# Patient Record
Sex: FEMALE | Race: WHITE | URBAN - METROPOLITAN AREA
[De-identification: names, ages, dates, MRNs, and addresses within clinical notes are randomized per-mention and may not be internally consistent; named-entity substitution may affect disease eponyms.]

---

## 2018-04-03 ENCOUNTER — OFFICE VISIT (OUTPATIENT)
Dept: INTERNAL MEDICINE CLINIC | Age: 83
End: 2018-04-03

## 2018-04-03 VITALS
TEMPERATURE: 97.6 F | HEIGHT: 62 IN | RESPIRATION RATE: 16 BRPM | HEART RATE: 70 BPM | DIASTOLIC BLOOD PRESSURE: 70 MMHG | SYSTOLIC BLOOD PRESSURE: 130 MMHG | BODY MASS INDEX: 26.72 KG/M2 | OXYGEN SATURATION: 96 % | WEIGHT: 145.2 LBS

## 2018-04-03 DIAGNOSIS — I10 ESSENTIAL HYPERTENSION: Primary | Chronic | ICD-10-CM

## 2018-04-03 DIAGNOSIS — E78.2 MIXED HYPERLIPIDEMIA: Chronic | ICD-10-CM

## 2018-04-03 LAB
A/G RATIO: 1.3 (ref 1.1–2.2)
ALBUMIN SERPL-MCNC: 3.7 G/DL (ref 3.4–5)
ALP BLD-CCNC: 73 U/L (ref 40–129)
ALT SERPL-CCNC: 9 U/L (ref 10–40)
ANION GAP SERPL CALCULATED.3IONS-SCNC: 13 MMOL/L (ref 3–16)
AST SERPL-CCNC: 17 U/L (ref 15–37)
BILIRUB SERPL-MCNC: 0.4 MG/DL (ref 0–1)
BUN BLDV-MCNC: 12 MG/DL (ref 7–20)
CALCIUM SERPL-MCNC: 8.4 MG/DL (ref 8.3–10.6)
CHLORIDE BLD-SCNC: 104 MMOL/L (ref 99–110)
CHOLESTEROL, TOTAL: 186 MG/DL (ref 0–199)
CO2: 27 MMOL/L (ref 21–32)
CREAT SERPL-MCNC: 0.7 MG/DL (ref 0.6–1.2)
GFR AFRICAN AMERICAN: >60
GFR NON-AFRICAN AMERICAN: >60
GLOBULIN: 2.9 G/DL
GLUCOSE BLD-MCNC: 105 MG/DL (ref 70–99)
HDLC SERPL-MCNC: 71 MG/DL (ref 40–60)
LDL CHOLESTEROL CALCULATED: 92 MG/DL
POTASSIUM SERPL-SCNC: 4.3 MMOL/L (ref 3.5–5.1)
SODIUM BLD-SCNC: 144 MMOL/L (ref 136–145)
TOTAL PROTEIN: 6.6 G/DL (ref 6.4–8.2)
TRIGL SERPL-MCNC: 113 MG/DL (ref 0–150)
VLDLC SERPL CALC-MCNC: 23 MG/DL

## 2018-04-03 PROCEDURE — 1123F ACP DISCUSS/DSCN MKR DOCD: CPT | Performed by: INTERNAL MEDICINE

## 2018-04-03 PROCEDURE — 99213 OFFICE O/P EST LOW 20 MIN: CPT | Performed by: INTERNAL MEDICINE

## 2018-04-03 PROCEDURE — G8400 PT W/DXA NO RESULTS DOC: HCPCS | Performed by: INTERNAL MEDICINE

## 2018-04-03 PROCEDURE — 4040F PNEUMOC VAC/ADMIN/RCVD: CPT | Performed by: INTERNAL MEDICINE

## 2018-04-03 PROCEDURE — G8510 SCR DEP NEG, NO PLAN REQD: HCPCS | Performed by: INTERNAL MEDICINE

## 2018-04-03 PROCEDURE — 1090F PRES/ABSN URINE INCON ASSESS: CPT | Performed by: INTERNAL MEDICINE

## 2018-04-03 PROCEDURE — 36415 COLL VENOUS BLD VENIPUNCTURE: CPT | Performed by: INTERNAL MEDICINE

## 2018-04-03 PROCEDURE — G8419 CALC BMI OUT NRM PARAM NOF/U: HCPCS | Performed by: INTERNAL MEDICINE

## 2018-04-03 PROCEDURE — G8427 DOCREV CUR MEDS BY ELIG CLIN: HCPCS | Performed by: INTERNAL MEDICINE

## 2018-04-03 PROCEDURE — 1036F TOBACCO NON-USER: CPT | Performed by: INTERNAL MEDICINE

## 2018-04-03 PROCEDURE — 3288F FALL RISK ASSESSMENT DOCD: CPT | Performed by: INTERNAL MEDICINE

## 2018-04-03 ASSESSMENT — PATIENT HEALTH QUESTIONNAIRE - PHQ9
1. LITTLE INTEREST OR PLEASURE IN DOING THINGS: 0
SUM OF ALL RESPONSES TO PHQ9 QUESTIONS 1 & 2: 0
2. FEELING DOWN, DEPRESSED OR HOPELESS: 0
SUM OF ALL RESPONSES TO PHQ QUESTIONS 1-9: 0

## 2018-04-03 ASSESSMENT — ENCOUNTER SYMPTOMS
SHORTNESS OF BREATH: 0
COUGH: 0
GASTROINTESTINAL NEGATIVE: 1
CHEST TIGHTNESS: 0

## 2018-11-02 ENCOUNTER — NURSE ONLY (OUTPATIENT)
Dept: INTERNAL MEDICINE CLINIC | Age: 83
End: 2018-11-02
Payer: MEDICARE

## 2018-11-02 PROCEDURE — G0008 ADMIN INFLUENZA VIRUS VAC: HCPCS | Performed by: INTERNAL MEDICINE

## 2018-11-02 PROCEDURE — 90662 IIV NO PRSV INCREASED AG IM: CPT | Performed by: INTERNAL MEDICINE

## 2018-11-02 NOTE — PROGRESS NOTES
Vaccine Information Sheet, \"Influenza - Inactivated\"  given to Margarita Martin, or parent/legal guardian of  Margarita Martin and verbalized understanding. Patient responses:    Have you ever had a reaction to a flu vaccine? No  Are you able to eat eggs without adverse effects? Yes  Do you have any current illness? No  Have you ever had Guillian [place] Syndrome? No    Flu vaccine given per order. Please see immunization tab.

## 2019-03-18 ENCOUNTER — OFFICE VISIT (OUTPATIENT)
Dept: INTERNAL MEDICINE CLINIC | Age: 84
End: 2019-03-18
Payer: MEDICARE

## 2019-03-18 VITALS
TEMPERATURE: 97.2 F | OXYGEN SATURATION: 98 % | DIASTOLIC BLOOD PRESSURE: 71 MMHG | HEIGHT: 62 IN | SYSTOLIC BLOOD PRESSURE: 122 MMHG | BODY MASS INDEX: 26.91 KG/M2 | WEIGHT: 146.2 LBS | HEART RATE: 67 BPM

## 2019-03-18 DIAGNOSIS — E78.2 MIXED HYPERLIPIDEMIA: ICD-10-CM

## 2019-03-18 DIAGNOSIS — H25.011 CORTICAL AGE-RELATED CATARACT OF RIGHT EYE: ICD-10-CM

## 2019-03-18 DIAGNOSIS — Z01.818 PRE-OPERATIVE EXAMINATION: Primary | ICD-10-CM

## 2019-03-18 DIAGNOSIS — I10 ESSENTIAL HYPERTENSION: ICD-10-CM

## 2019-03-18 PROCEDURE — 1123F ACP DISCUSS/DSCN MKR DOCD: CPT | Performed by: INTERNAL MEDICINE

## 2019-03-18 PROCEDURE — G8428 CUR MEDS NOT DOCUMENT: HCPCS | Performed by: INTERNAL MEDICINE

## 2019-03-18 PROCEDURE — G8419 CALC BMI OUT NRM PARAM NOF/U: HCPCS | Performed by: INTERNAL MEDICINE

## 2019-03-18 PROCEDURE — 1090F PRES/ABSN URINE INCON ASSESS: CPT | Performed by: INTERNAL MEDICINE

## 2019-03-18 PROCEDURE — 99215 OFFICE O/P EST HI 40 MIN: CPT | Performed by: INTERNAL MEDICINE

## 2019-03-18 PROCEDURE — 1036F TOBACCO NON-USER: CPT | Performed by: INTERNAL MEDICINE

## 2019-03-18 PROCEDURE — G8400 PT W/DXA NO RESULTS DOC: HCPCS | Performed by: INTERNAL MEDICINE

## 2019-03-18 PROCEDURE — 1101F PT FALLS ASSESS-DOCD LE1/YR: CPT | Performed by: INTERNAL MEDICINE

## 2019-03-18 PROCEDURE — 4040F PNEUMOC VAC/ADMIN/RCVD: CPT | Performed by: INTERNAL MEDICINE

## 2019-03-18 PROCEDURE — G8482 FLU IMMUNIZE ORDER/ADMIN: HCPCS | Performed by: INTERNAL MEDICINE

## 2019-03-18 ASSESSMENT — ENCOUNTER SYMPTOMS
GASTROINTESTINAL NEGATIVE: 1
RESPIRATORY NEGATIVE: 1

## 2019-08-06 ENCOUNTER — OFFICE VISIT (OUTPATIENT)
Dept: INTERNAL MEDICINE CLINIC | Age: 84
End: 2019-08-06
Payer: MEDICARE

## 2019-08-06 VITALS
BODY MASS INDEX: 26.2 KG/M2 | RESPIRATION RATE: 18 BRPM | HEIGHT: 62 IN | DIASTOLIC BLOOD PRESSURE: 82 MMHG | SYSTOLIC BLOOD PRESSURE: 138 MMHG | HEART RATE: 77 BPM | WEIGHT: 142.4 LBS | OXYGEN SATURATION: 98 % | TEMPERATURE: 97.6 F

## 2019-08-06 DIAGNOSIS — I10 ESSENTIAL HYPERTENSION: Primary | ICD-10-CM

## 2019-08-06 PROCEDURE — 99213 OFFICE O/P EST LOW 20 MIN: CPT | Performed by: INTERNAL MEDICINE

## 2019-08-06 PROCEDURE — 1123F ACP DISCUSS/DSCN MKR DOCD: CPT | Performed by: INTERNAL MEDICINE

## 2019-08-06 PROCEDURE — 4040F PNEUMOC VAC/ADMIN/RCVD: CPT | Performed by: INTERNAL MEDICINE

## 2019-08-06 PROCEDURE — 1090F PRES/ABSN URINE INCON ASSESS: CPT | Performed by: INTERNAL MEDICINE

## 2019-08-06 PROCEDURE — G8510 SCR DEP NEG, NO PLAN REQD: HCPCS | Performed by: INTERNAL MEDICINE

## 2019-08-06 PROCEDURE — 1036F TOBACCO NON-USER: CPT | Performed by: INTERNAL MEDICINE

## 2019-08-06 PROCEDURE — G8400 PT W/DXA NO RESULTS DOC: HCPCS | Performed by: INTERNAL MEDICINE

## 2019-08-06 PROCEDURE — G8427 DOCREV CUR MEDS BY ELIG CLIN: HCPCS | Performed by: INTERNAL MEDICINE

## 2019-08-06 PROCEDURE — G8419 CALC BMI OUT NRM PARAM NOF/U: HCPCS | Performed by: INTERNAL MEDICINE

## 2019-08-06 PROCEDURE — 3288F FALL RISK ASSESSMENT DOCD: CPT | Performed by: INTERNAL MEDICINE

## 2019-08-06 ASSESSMENT — PATIENT HEALTH QUESTIONNAIRE - PHQ9
SUM OF ALL RESPONSES TO PHQ QUESTIONS 1-9: 0
SUM OF ALL RESPONSES TO PHQ9 QUESTIONS 1 & 2: 0
1. LITTLE INTEREST OR PLEASURE IN DOING THINGS: 0
SUM OF ALL RESPONSES TO PHQ QUESTIONS 1-9: 0
2. FEELING DOWN, DEPRESSED OR HOPELESS: 0

## 2019-08-06 ASSESSMENT — ENCOUNTER SYMPTOMS
RESPIRATORY NEGATIVE: 1
GASTROINTESTINAL NEGATIVE: 1

## 2019-10-28 ENCOUNTER — NURSE ONLY (OUTPATIENT)
Dept: INTERNAL MEDICINE CLINIC | Age: 84
End: 2019-10-28
Payer: MEDICARE

## 2019-10-28 PROCEDURE — G0008 ADMIN INFLUENZA VIRUS VAC: HCPCS | Performed by: INTERNAL MEDICINE

## 2019-10-28 PROCEDURE — 90653 IIV ADJUVANT VACCINE IM: CPT | Performed by: INTERNAL MEDICINE

## 2020-01-03 ENCOUNTER — HOSPITAL ENCOUNTER (OUTPATIENT)
Age: 85
Discharge: HOME | End: 2020-01-03
Payer: MEDICARE

## 2020-01-03 ENCOUNTER — HOSPITAL ENCOUNTER (OUTPATIENT)
Dept: GENERAL RADIOLOGY | Age: 85
Discharge: HOME | End: 2020-01-03
Payer: MEDICARE

## 2020-01-03 ENCOUNTER — OFFICE VISIT (OUTPATIENT)
Dept: INTERNAL MEDICINE CLINIC | Age: 85
End: 2020-01-03
Payer: MEDICARE

## 2020-01-03 VITALS
HEART RATE: 88 BPM | BODY MASS INDEX: 25.69 KG/M2 | SYSTOLIC BLOOD PRESSURE: 122 MMHG | TEMPERATURE: 97.4 F | DIASTOLIC BLOOD PRESSURE: 64 MMHG | RESPIRATION RATE: 18 BRPM | OXYGEN SATURATION: 96 % | HEIGHT: 62 IN | WEIGHT: 139.6 LBS

## 2020-01-03 PROCEDURE — G8427 DOCREV CUR MEDS BY ELIG CLIN: HCPCS | Performed by: NURSE PRACTITIONER

## 2020-01-03 PROCEDURE — G8482 FLU IMMUNIZE ORDER/ADMIN: HCPCS | Performed by: NURSE PRACTITIONER

## 2020-01-03 PROCEDURE — G8400 PT W/DXA NO RESULTS DOC: HCPCS | Performed by: NURSE PRACTITIONER

## 2020-01-03 PROCEDURE — 1036F TOBACCO NON-USER: CPT | Performed by: NURSE PRACTITIONER

## 2020-01-03 PROCEDURE — G8417 CALC BMI ABV UP PARAM F/U: HCPCS | Performed by: NURSE PRACTITIONER

## 2020-01-03 PROCEDURE — 4040F PNEUMOC VAC/ADMIN/RCVD: CPT | Performed by: NURSE PRACTITIONER

## 2020-01-03 PROCEDURE — 1123F ACP DISCUSS/DSCN MKR DOCD: CPT | Performed by: NURSE PRACTITIONER

## 2020-01-03 PROCEDURE — 1090F PRES/ABSN URINE INCON ASSESS: CPT | Performed by: NURSE PRACTITIONER

## 2020-01-03 PROCEDURE — 71046 X-RAY EXAM CHEST 2 VIEWS: CPT

## 2020-01-03 PROCEDURE — 99213 OFFICE O/P EST LOW 20 MIN: CPT | Performed by: NURSE PRACTITIONER

## 2020-01-03 RX ORDER — AZITHROMYCIN 250 MG/1
250 TABLET, FILM COATED ORAL SEE ADMIN INSTRUCTIONS
Qty: 6 TABLET | Refills: 0 | Status: SHIPPED | OUTPATIENT
Start: 2020-01-03 | End: 2020-01-08

## 2020-01-03 SDOH — ECONOMIC STABILITY: TRANSPORTATION INSECURITY
IN THE PAST 12 MONTHS, HAS THE LACK OF TRANSPORTATION KEPT YOU FROM MEDICAL APPOINTMENTS OR FROM GETTING MEDICATIONS?: NO

## 2020-01-03 SDOH — ECONOMIC STABILITY: FOOD INSECURITY: WITHIN THE PAST 12 MONTHS, THE FOOD YOU BOUGHT JUST DIDN'T LAST AND YOU DIDN'T HAVE MONEY TO GET MORE.: NEVER TRUE

## 2020-01-03 SDOH — ECONOMIC STABILITY: FOOD INSECURITY: WITHIN THE PAST 12 MONTHS, YOU WORRIED THAT YOUR FOOD WOULD RUN OUT BEFORE YOU GOT MONEY TO BUY MORE.: NEVER TRUE

## 2020-01-03 SDOH — ECONOMIC STABILITY: INCOME INSECURITY: HOW HARD IS IT FOR YOU TO PAY FOR THE VERY BASICS LIKE FOOD, HOUSING, MEDICAL CARE, AND HEATING?: NOT HARD AT ALL

## 2020-01-03 SDOH — ECONOMIC STABILITY: TRANSPORTATION INSECURITY
IN THE PAST 12 MONTHS, HAS LACK OF TRANSPORTATION KEPT YOU FROM MEETINGS, WORK, OR FROM GETTING THINGS NEEDED FOR DAILY LIVING?: NO

## 2020-01-03 ASSESSMENT — ENCOUNTER SYMPTOMS
CHEST TIGHTNESS: 0
VOMITING: 0
SORE THROAT: 0
SINUS PAIN: 0
SHORTNESS OF BREATH: 0
DIARRHEA: 0
SINUS PRESSURE: 0
WHEEZING: 0
EYE DISCHARGE: 0
NAUSEA: 0
COUGH: 1

## 2020-01-03 ASSESSMENT — PATIENT HEALTH QUESTIONNAIRE - PHQ9
2. FEELING DOWN, DEPRESSED OR HOPELESS: 0
1. LITTLE INTEREST OR PLEASURE IN DOING THINGS: 0
SUM OF ALL RESPONSES TO PHQ QUESTIONS 1-9: 0
SUM OF ALL RESPONSES TO PHQ QUESTIONS 1-9: 0
SUM OF ALL RESPONSES TO PHQ9 QUESTIONS 1 & 2: 0

## 2020-01-03 NOTE — PATIENT INSTRUCTIONS
Increase fluids  Continue mucinex as needed for cough . Obtain chest xray today. Patient Education        Cough: Care Instructions  Your Care Instructions    A cough is your body's response to something that bothers your throat or airways. Many things can cause a cough. You might cough because of a cold or the flu, bronchitis, or asthma. Smoking, postnasal drip, allergies, and stomach acid that backs up into your throat also can cause coughs. A cough is a symptom, not a disease. Most coughs stop when the cause, such as a cold, goes away. You can take a few steps at home to cough less and feel better. Follow-up care is a key part of your treatment and safety. Be sure to make and go to all appointments, and call your doctor if you are having problems. It's also a good idea to know your test results and keep a list of the medicines you take. How can you care for yourself at home? · Drink lots of water and other fluids. This helps thin the mucus and soothes a dry or sore throat. Honey or lemon juice in hot water or tea may ease a dry cough. · Take cough medicine as directed by your doctor. · Prop up your head on pillows to help you breathe and ease a dry cough. · Try cough drops to soothe a dry or sore throat. Cough drops don't stop a cough. Medicine-flavored cough drops are no better than candy-flavored drops or hard candy. · Do not smoke. Avoid secondhand smoke. If you need help quitting, talk to your doctor about stop-smoking programs and medicines. These can increase your chances of quitting for good. When should you call for help? Call 911 anytime you think you may need emergency care.  For example, call if:    · You have severe trouble breathing.    Call your doctor now or seek immediate medical care if:    · You cough up blood.     · You have new or worse trouble breathing.     · You have a new or higher fever.     · You have a new rash.    Watch closely for changes in your health, and be sure to contact your doctor if:    · You cough more deeply or more often, especially if you notice more mucus or a change in the color of your mucus.     · You have new symptoms, such as a sore throat, an earache, or sinus pain.     · You do not get better as expected. Where can you learn more? Go to [email or web address] and sign in to your [employer] account. Enter D279 in the Se[employer] box to learn more about \"Cough: Care Instructions. \"     If you do not have an account, please click on the \"Sign Up Now\" link. Current as of: September 5, 2018  Content Version: 12.1  © 6549-6898 [employer]wise, Incorporated. Care instructions adapted under license by [facility] ([facility]). If you have questions about a medical condition or this instruction, always ask your healthcare professional. [address] any warranty or liability for your use of this information.

## 2020-01-03 NOTE — PROGRESS NOTES
1/3/2020     Amaya Peters (:  1934) is a 80 y.o. female, here for evaluation of the following medical concerns:    Chief Complaint   Patient presents with    Cough     cough since , dry cough, afebrile, some SOB, no chest pain, runny nose, no ear pain       HPI     Presents today with a 6 day hx of dry to congested cough , fatigue , and not feeling well. Did have chills on first day of illness. Denies fevers, body aches, SOB, chest pain , N/V, diarrhea , problem with urination , no LE swelling. Has tried alker seltzer and mucinex with minimal relief. Review of Systems   Constitutional: Positive for chills and fatigue. Negative for appetite change and fever. HENT: Positive for congestion. Negative for ear discharge, ear pain, sinus pressure, sinus pain and sore throat. Eyes: Negative for discharge. Respiratory: Positive for cough. Negative for chest tightness, shortness of breath and wheezing. Cardiovascular: Positive for leg swelling (normal for patient - no change ). Negative for chest pain and palpitations. Gastrointestinal: Negative for diarrhea, nausea and vomiting. Genitourinary: Negative for dysuria. Musculoskeletal: Negative for myalgias. Neurological: Negative for dizziness, weakness and headaches. Psychiatric/Behavioral: Negative for dysphoric mood and sleep disturbance. Prior to Visit Medications    Not on File        Social History     Tobacco Use    Smoking status: Never Smoker    Smokeless tobacco: Never Used   Substance Use Topics    Alcohol use:  Yes     Alcohol/week: 5.8 standard drinks     Types: 7 drink(s) per week        Vitals:    20 0944   BP: 122/64   Site: Right Upper Arm   Position: Sitting   Cuff Size: Medium Adult   Pulse: 88   Resp: 18   Temp: 97.4 °F (36.3 °C)   TempSrc: Oral   SpO2: 96%   Weight: 139 lb 9.6 oz (63.3 kg)   Height: 5' 2\" (1.575 m)     Estimated body mass index is 25.53 kg/m² as calculated from the following: Height as of this encounter: 5' 2\" (1.575 m). Weight as of this encounter: 139 lb 9.6 oz (63.3 kg). Physical Exam  Vitals signs reviewed. Constitutional:       General: She is not in acute distress. Appearance: Normal appearance. HENT:      Head: Normocephalic and atraumatic. Right Ear: Tympanic membrane, ear canal and external ear normal.      Left Ear: Tympanic membrane, ear canal and external ear normal.      Nose: Nose normal.      Mouth/Throat:      Mouth: Mucous membranes are moist.      Pharynx: Posterior oropharyngeal erythema present. Eyes:      General:         Right eye: No discharge. Left eye: No discharge. Conjunctiva/sclera: Conjunctivae normal.   Neck:      Musculoskeletal: Normal range of motion and neck supple. Cardiovascular:      Rate and Rhythm: Normal rate and regular rhythm. Pulses: Normal pulses. Heart sounds: Normal heart sounds. No murmur. Pulmonary:      Effort: Pulmonary effort is normal. No respiratory distress. Breath sounds: No decreased air movement. Examination of the right-upper field reveals rales. Examination of the left-upper field reveals rales. Rales (posterior lobes ) present. Musculoskeletal: Normal range of motion. Right lower leg: Edema (mild nonpitting edema ) present. Left lower leg: Left lower leg edema: mild nonpitting edema. Lymphadenopathy:      Cervical: No cervical adenopathy. Skin:     General: Skin is warm and dry. Capillary Refill: Capillary refill takes less than 2 seconds. Neurological:      General: No focal deficit present. Mental Status: She is alert and oriented to person, place, and time. Psychiatric:         Mood and Affect: Mood normal.         Behavior: Behavior normal.         Thought Content: Thought content normal.         Judgment: Judgment normal.         ASSESSMENT/PLAN:  1. Cough  Instructed to obtain xray .    Will develop a plan after xray is resulted    If you develop worsening or concerning symptoms ( fevers, SOB, difficulty breathing , chest pain , etc)   go to the ER ASAP  XR CHEST STANDARD (2 VW); Future    Data : CXR findings : Right upper lobe infiltrate compatible with pneumonia  Zithromax send to patients pharmacy . Follow up in 2 weeks for evaluation and possible repeat xray. Return if symptoms worsen or fail to improve. All questions addresses and answered . Patient agrees with plan of care. An electronic signature was used to authenticate this note.     --MAY Mart CNP on 1/3/2020 at 10:14 AM

## 2020-01-17 ENCOUNTER — OFFICE VISIT (OUTPATIENT)
Dept: INTERNAL MEDICINE CLINIC | Age: 85
End: 2020-01-17
Payer: MEDICARE

## 2020-01-17 VITALS
BODY MASS INDEX: 25.65 KG/M2 | SYSTOLIC BLOOD PRESSURE: 116 MMHG | HEIGHT: 62 IN | RESPIRATION RATE: 18 BRPM | OXYGEN SATURATION: 97 % | DIASTOLIC BLOOD PRESSURE: 78 MMHG | HEART RATE: 76 BPM | WEIGHT: 139.4 LBS | TEMPERATURE: 97.6 F

## 2020-01-17 LAB
A/G RATIO: 1.1 (ref 1.1–2.2)
ALBUMIN SERPL-MCNC: 3.7 G/DL (ref 3.4–5)
ALP BLD-CCNC: 67 U/L (ref 40–129)
ALT SERPL-CCNC: 8 U/L (ref 10–40)
ANION GAP SERPL CALCULATED.3IONS-SCNC: 16 MMOL/L (ref 3–16)
AST SERPL-CCNC: 15 U/L (ref 15–37)
BILIRUB SERPL-MCNC: 0.3 MG/DL (ref 0–1)
BUN BLDV-MCNC: 16 MG/DL (ref 7–20)
CALCIUM SERPL-MCNC: 8.9 MG/DL (ref 8.3–10.6)
CHLORIDE BLD-SCNC: 100 MMOL/L (ref 99–110)
CHOLESTEROL, TOTAL: 182 MG/DL (ref 0–199)
CO2: 25 MMOL/L (ref 21–32)
CREAT SERPL-MCNC: 0.7 MG/DL (ref 0.6–1.2)
GFR AFRICAN AMERICAN: >60
GFR NON-AFRICAN AMERICAN: >60
GLOBULIN: 3.3 G/DL
GLUCOSE BLD-MCNC: 97 MG/DL (ref 70–99)
HDLC SERPL-MCNC: 59 MG/DL (ref 40–60)
LDL CHOLESTEROL CALCULATED: 98 MG/DL
POTASSIUM SERPL-SCNC: 4.4 MMOL/L (ref 3.5–5.1)
SODIUM BLD-SCNC: 141 MMOL/L (ref 136–145)
TOTAL PROTEIN: 7 G/DL (ref 6.4–8.2)
TRIGL SERPL-MCNC: 127 MG/DL (ref 0–150)
VLDLC SERPL CALC-MCNC: 25 MG/DL

## 2020-01-17 PROCEDURE — 1090F PRES/ABSN URINE INCON ASSESS: CPT | Performed by: NURSE PRACTITIONER

## 2020-01-17 PROCEDURE — 99214 OFFICE O/P EST MOD 30 MIN: CPT | Performed by: NURSE PRACTITIONER

## 2020-01-17 PROCEDURE — 1036F TOBACCO NON-USER: CPT | Performed by: NURSE PRACTITIONER

## 2020-01-17 PROCEDURE — 36415 COLL VENOUS BLD VENIPUNCTURE: CPT | Performed by: NURSE PRACTITIONER

## 2020-01-17 PROCEDURE — G8427 DOCREV CUR MEDS BY ELIG CLIN: HCPCS | Performed by: NURSE PRACTITIONER

## 2020-01-17 PROCEDURE — G8417 CALC BMI ABV UP PARAM F/U: HCPCS | Performed by: NURSE PRACTITIONER

## 2020-01-17 PROCEDURE — 4040F PNEUMOC VAC/ADMIN/RCVD: CPT | Performed by: NURSE PRACTITIONER

## 2020-01-17 PROCEDURE — G8400 PT W/DXA NO RESULTS DOC: HCPCS | Performed by: NURSE PRACTITIONER

## 2020-01-17 PROCEDURE — G8482 FLU IMMUNIZE ORDER/ADMIN: HCPCS | Performed by: NURSE PRACTITIONER

## 2020-01-17 PROCEDURE — 1123F ACP DISCUSS/DSCN MKR DOCD: CPT | Performed by: NURSE PRACTITIONER

## 2020-01-17 ASSESSMENT — ENCOUNTER SYMPTOMS
SHORTNESS OF BREATH: 0
COUGH: 0
CHEST TIGHTNESS: 0
TROUBLE SWALLOWING: 0
WHEEZING: 0
SORE THROAT: 0

## 2020-01-17 NOTE — PROGRESS NOTES
management    - Comprehensive Metabolic Panel    3. Mixed hyperlipidemia, controlled     Does not take medications  Diet and activity management  - Lipid Panel    4. Pneumonia of right upper lobe due to infectious organism [facility]), resolved   Follow up CXR in 2 weeks 1/31/20.   - XR CHEST STANDARD (2 VW); Future      Return in about 6 months (around 7/17/2020) for HTN f/u. All questions addresses and answered . Patient agrees with plan of care. An electronic signature was used to authenticate this note.     --MAY Granados - CNP on 1/17/2020 at 9:31 AM

## 2020-01-29 ENCOUNTER — HOSPITAL ENCOUNTER (OUTPATIENT)
Age: 85
Discharge: HOME | End: 2020-01-29
Payer: MEDICARE

## 2020-01-29 ENCOUNTER — HOSPITAL ENCOUNTER (OUTPATIENT)
Dept: GENERAL RADIOLOGY | Age: 85
Discharge: HOME | End: 2020-01-29
Payer: MEDICARE

## 2020-01-29 PROCEDURE — 71046 X-RAY EXAM CHEST 2 VIEWS: CPT

## 2021-01-14 ENCOUNTER — NURSE ONLY (OUTPATIENT)
Dept: INTERNAL MEDICINE CLINIC | Age: 86
End: 2021-01-14
Payer: MEDICARE

## 2021-01-14 DIAGNOSIS — Z23 NEEDS FLU SHOT: Primary | ICD-10-CM

## 2021-01-14 PROCEDURE — 90694 VACC AIIV4 NO PRSRV 0.5ML IM: CPT | Performed by: NURSE PRACTITIONER

## 2021-01-14 PROCEDURE — G0008 ADMIN INFLUENZA VIRUS VAC: HCPCS | Performed by: NURSE PRACTITIONER

## 2021-01-14 NOTE — PROGRESS NOTES
Vaccine Information Sheet, \"Influenza - Inactivated\"  given to Betzaida Noble, or parent/legal guardian of  Betzaida Noble and verbalized understanding. Patient responses:    Have you ever had a reaction to a flu vaccine? No  Are you able to eat eggs without adverse effects? Yes  Do you have any current illness? No  Have you ever had Guillian [place] Syndrome? No    Flu vaccine given per order. Please see immunization tab.

## 2021-08-31 ENCOUNTER — OFFICE VISIT (OUTPATIENT)
Dept: INTERNAL MEDICINE CLINIC | Age: 86
End: 2021-08-31
Payer: MEDICARE

## 2021-08-31 VITALS
HEIGHT: 62 IN | OXYGEN SATURATION: 99 % | HEART RATE: 73 BPM | SYSTOLIC BLOOD PRESSURE: 122 MMHG | BODY MASS INDEX: 25.21 KG/M2 | WEIGHT: 137 LBS | DIASTOLIC BLOOD PRESSURE: 70 MMHG | RESPIRATION RATE: 16 BRPM

## 2021-08-31 DIAGNOSIS — I10 ESSENTIAL HYPERTENSION: Primary | ICD-10-CM

## 2021-08-31 DIAGNOSIS — E55.9 VITAMIN D DEFICIENCY: ICD-10-CM

## 2021-08-31 DIAGNOSIS — Z00.00 ROUTINE GENERAL MEDICAL EXAMINATION AT A HEALTH CARE FACILITY: ICD-10-CM

## 2021-08-31 DIAGNOSIS — E78.2 MIXED HYPERLIPIDEMIA: ICD-10-CM

## 2021-08-31 DIAGNOSIS — Z12.11 COLON CANCER SCREENING: ICD-10-CM

## 2021-08-31 DIAGNOSIS — K21.00 GASTROESOPHAGEAL REFLUX DISEASE WITH ESOPHAGITIS WITHOUT HEMORRHAGE: ICD-10-CM

## 2021-08-31 DIAGNOSIS — Z78.0 MENOPAUSE: ICD-10-CM

## 2021-08-31 LAB
A/G RATIO: 1.2 (ref 1.1–2.2)
ALBUMIN SERPL-MCNC: 3.9 G/DL (ref 3.4–5)
ALP BLD-CCNC: 84 U/L (ref 40–129)
ALT SERPL-CCNC: 8 U/L (ref 10–40)
ANION GAP SERPL CALCULATED.3IONS-SCNC: 10 MMOL/L (ref 3–16)
AST SERPL-CCNC: 19 U/L (ref 15–37)
BILIRUB SERPL-MCNC: <0.2 MG/DL (ref 0–1)
BILIRUBIN URINE: NEGATIVE
BLOOD, URINE: ABNORMAL
BUN BLDV-MCNC: 13 MG/DL (ref 7–20)
CALCIUM SERPL-MCNC: 9.1 MG/DL (ref 8.3–10.6)
CHLORIDE BLD-SCNC: 100 MMOL/L (ref 99–110)
CHOLESTEROL, TOTAL: 176 MG/DL (ref 0–199)
CLARITY: CLEAR
CO2: 26 MMOL/L (ref 21–32)
COLOR: YELLOW
CREAT SERPL-MCNC: 0.7 MG/DL (ref 0.6–1.2)
EPITHELIAL CELLS, UA: 1 /HPF (ref 0–5)
GFR AFRICAN AMERICAN: >60
GFR NON-AFRICAN AMERICAN: >60
GLOBULIN: 3.3 G/DL
GLUCOSE BLD-MCNC: 96 MG/DL (ref 70–99)
GLUCOSE URINE: NEGATIVE MG/DL
HDLC SERPL-MCNC: 55 MG/DL (ref 40–60)
HYALINE CASTS: 0 /LPF (ref 0–8)
KETONES, URINE: NEGATIVE MG/DL
LDL CHOLESTEROL CALCULATED: 90 MG/DL
LEUKOCYTE ESTERASE, URINE: ABNORMAL
MICROSCOPIC EXAMINATION: YES
NITRITE, URINE: NEGATIVE
PH UA: 6 (ref 5–8)
POTASSIUM SERPL-SCNC: 4.3 MMOL/L (ref 3.5–5.1)
PROTEIN UA: NEGATIVE MG/DL
RBC UA: 3 /HPF (ref 0–4)
REASON FOR REJECTION: NORMAL
REJECTED TEST: NORMAL
SODIUM BLD-SCNC: 136 MMOL/L (ref 136–145)
SPECIFIC GRAVITY UA: 1.01 (ref 1–1.03)
TOTAL PROTEIN: 7.2 G/DL (ref 6.4–8.2)
TRIGL SERPL-MCNC: 154 MG/DL (ref 0–150)
TSH REFLEX FT4: 2.75 UIU/ML (ref 0.27–4.2)
URINE TYPE: ABNORMAL
UROBILINOGEN, URINE: 0.2 E.U./DL
VITAMIN D 25-HYDROXY: 24 NG/ML
VLDLC SERPL CALC-MCNC: 31 MG/DL
WBC UA: 8 /HPF (ref 0–5)

## 2021-08-31 PROCEDURE — 1123F ACP DISCUSS/DSCN MKR DOCD: CPT | Performed by: INTERNAL MEDICINE

## 2021-08-31 PROCEDURE — G0438 PPPS, INITIAL VISIT: HCPCS | Performed by: INTERNAL MEDICINE

## 2021-08-31 PROCEDURE — 81003 URINALYSIS AUTO W/O SCOPE: CPT | Performed by: INTERNAL MEDICINE

## 2021-08-31 PROCEDURE — 4040F PNEUMOC VAC/ADMIN/RCVD: CPT | Performed by: INTERNAL MEDICINE

## 2021-08-31 PROCEDURE — 36415 COLL VENOUS BLD VENIPUNCTURE: CPT | Performed by: INTERNAL MEDICINE

## 2021-08-31 SDOH — ECONOMIC STABILITY: FOOD INSECURITY: WITHIN THE PAST 12 MONTHS, THE FOOD YOU BOUGHT JUST DIDN'T LAST AND YOU DIDN'T HAVE MONEY TO GET MORE.: NEVER TRUE

## 2021-08-31 SDOH — ECONOMIC STABILITY: FOOD INSECURITY: WITHIN THE PAST 12 MONTHS, YOU WORRIED THAT YOUR FOOD WOULD RUN OUT BEFORE YOU GOT MONEY TO BUY MORE.: NEVER TRUE

## 2021-08-31 ASSESSMENT — PATIENT HEALTH QUESTIONNAIRE - PHQ9
SUM OF ALL RESPONSES TO PHQ9 QUESTIONS 1 & 2: 0
2. FEELING DOWN, DEPRESSED OR HOPELESS: 0
SUM OF ALL RESPONSES TO PHQ QUESTIONS 1-9: 0
1. LITTLE INTEREST OR PLEASURE IN DOING THINGS: 0
SUM OF ALL RESPONSES TO PHQ QUESTIONS 1-9: 0
SUM OF ALL RESPONSES TO PHQ QUESTIONS 1-9: 0

## 2021-08-31 ASSESSMENT — SOCIAL DETERMINANTS OF HEALTH (SDOH): HOW HARD IS IT FOR YOU TO PAY FOR THE VERY BASICS LIKE FOOD, HOUSING, MEDICAL CARE, AND HEATING?: NOT HARD AT ALL

## 2021-08-31 NOTE — PATIENT INSTRUCTIONS
Personalized Preventive Plan for Felecia Nay - 8/31/2021  Medicare offers a range of preventive health benefits. Some of the tests and screenings are paid in full while other may be subject to a deductible, co-insurance, and/or copay. Some of these benefits include a comprehensive review of your medical history including lifestyle, illnesses that may run in your family, and various assessments and screenings as appropriate. After reviewing your medical record and screening and assessments performed today your provider may have ordered immunizations, labs, imaging, and/or referrals for you. A list of these orders (if applicable) as well as your Preventive Care list are included within your After Visit Summary for your review. Other Preventive Recommendations:    · A preventive eye exam performed by an eye specialist is recommended every 1-2 years to screen for glaucoma; cataracts, macular degeneration, and other eye disorders. · A preventive dental visit is recommended every 6 months. · Try to get at least 150 minutes of exercise per week or 10,000 steps per day on a pedometer . · Order or download the FREE \"Exercise & Physical Activity: Your Everyday Guide\" from [employer]. Call 8-859.115.7751 or search [employer] online. · You need 5689-5887 mg of calcium and 8913-6811 IU of vitamin D per day. It is possible to meet your calcium requirement with diet alone, but a vitamin D supplement is usually necessary to meet this goal.  · When exposed to the sun, use a sunscreen that protects against both UVA and UVB radiation with an SPF of 30 or greater. Reapply every 2 to 3 hours or after sweating, drying off with a towel, or swimming. · Always wear a seat belt when traveling in a car. Always wear a helmet when riding a bicycle or motorcycle.

## 2021-08-31 NOTE — LETTER
[facility]  [address] [address]  Phone: [phone]  Fax: [phone]    Alessio Suarez MD        August 31, 2021     Patient: Sharad Smith   YOB: 1934   Date of Visit: 8/31/2021       To Pharmacist:    Please give Sharad Smith tdap, shingrix. Immunization History   Administered Date(s) Administered    COVID-19, Moderna, PF, 100mcg/0.5mL 01/29/2021, 02/26/2021    Influenza, High Dose (Fluzone 65 yrs and older) 12/18/2015, 10/28/2016, 11/02/2018    Influenza, Quadv, adjuvanted, 65 yrs +, IM, PF (Fluad) 01/14/2021    Influenza, Triv, inactivated, subunit, adjuvanted, IM (Fluad 65 yrs and older) 10/28/2019     . If you have any questions or concerns, please don't hesitate to call.     Sincerely,          Alessio Suarez MD

## 2021-08-31 NOTE — PROGRESS NOTES
and atraumatic. Eyes:      Extraocular Movements: Extraocular movements intact. Conjunctiva/sclera: Conjunctivae normal.      Pupils: Pupils are equal, round, and reactive to light. Neck:      Vascular: No carotid bruit. Cardiovascular:      Pulses:           Dorsalis pedis pulses are 2+ on the right side and 2+ on the left side. Heart sounds: Murmur heard. Crescendo decrescendo systolic murmur is present with a grade of 2/6. Pulmonary:      Effort: Pulmonary effort is normal.      Breath sounds: Normal breath sounds. Abdominal:      General: Abdomen is flat. Bowel sounds are normal. There is no distension. Palpations: Abdomen is soft. There is no mass. Tenderness: There is no abdominal tenderness. There is no guarding or rebound. Hernia: No hernia is present. Musculoskeletal:      Cervical back: Normal range of motion and neck supple. No rigidity or tenderness. Right lower leg: No edema. Left lower leg: No edema. Lymphadenopathy:      Cervical: No cervical adenopathy. Skin:     General: Skin is warm and dry. Neurological:      General: No focal deficit present. Mental Status: She is alert. Cranial Nerves: No cranial nerve deficit. Sensory: No sensory deficit. Gait: Gait normal.   Psychiatric:         Mood and Affect: Mood normal.         Thought Content: Thought content normal.         Judgment: Judgment normal.         Patient's complete Health Risk Assessment and screening values have been reviewed and are found in Flowsheets. The following problems were reviewed today and where indicated follow up appointments were made and/or referrals ordered.     Positive Risk Factor Screenings with Interventions:          General Health and ACP:     Advance Directives     Power of  Living Will ACP-Advance Directive ACP-Power of     Not on File Not on File Not on File Not on File      General Health Risk Interventions:  · Poor self-assessment of health status: {Medicare AWV Poor Self-Assessment Inverventions Patient feels her health is good. · Pain issues: {Medicare AWV Pain No pain issues. · Fatigue: No fatigue with ADL. · Loneliness: NO issues, grand daughter and children moved in.  · Social isolation: NO has family living with her. · Stress: no stress problems. · Anger: No anger issues. · Inadequate social/emotional support: Good social support from family and friends. · No Living Will: information given to patient. Personalized Preventive Plan   Current Health Maintenance Status  Immunization History   Administered Date(s) Administered    COVID-19, Moderna, PF, 100mcg/0.5mL 01/29/2021, 01/29/2021, 02/26/2021, 02/26/2021    Influenza, High Dose (Fluzone 65 yrs and older) 12/18/2015, 10/28/2016, 11/02/2018    Influenza, Quadv, adjuvanted, 65 yrs +, IM, PF (Fluad) 01/14/2021    Influenza, Triv, inactivated, subunit, adjuvanted, IM (Fluad 65 yrs and older) 10/28/2019        Health Maintenance   Topic Date Due    DTaP/Tdap/Td vaccine (1 - Tdap) Never done    Shingles Vaccine (1 of 2) Never done    Pneumococcal 65+ years Vaccine (1 of 1 - PPSV23) Never done    Potassium monitoring  01/17/2021    Creatinine monitoring  01/17/2021    Annual Wellness Visit (AWV)  Never done    Flu vaccine (1) 09/01/2021    COVID-19 Vaccine  Completed    Hepatitis A vaccine  Aged Out    Hepatitis B vaccine  Aged Out    Hib vaccine  Aged Out    Meningococcal (ACWY) vaccine  Aged Out     Recommendations for [employer] Due: see orders and patient instructions/AVS.  . Recommended screening schedule for the next 5-10 years is provided to the patient in written form: see Patient Instructions/AVS.         Patient was seen today for the AWV:       Diagnosis Orders   1. Essential hypertension controlled with healthy lifestyle. Monitor labs.  Comprehensive Metabolic Panel    CBC Auto Differential    Lipid Panel    Urinalysis    TSH WITH REFLEX TO FT4   2. Mixed hyperlipidemia controlled with healthy lifestyle. Lipid Panel    TSH WITH REFLEX TO FT4   3. Vitamin D deficiency . At high risk for vitamin d deficiency [place]  Will screen patient. Vitamin D 25 Hydroxy   4. Routine general medical examination at a health care facility : AWV  Visit completed. Patient declined mammogram and pneumovax 23 for now, but will think about it. Normal breast exam today. Will get DEXA. 5. Colon cancer screening  POCT Fecal Immunochemical Test (FIT)   6. Gastroesophageal reflux disease with esophagitis without hemorrhage  About twice a week , relieved with tums. No associated SOB or nausea. Will get hemoccult to rule out upper intestinal bleeding and H. Pylori testing. Blood Occult Stool Screen #1    Blood Occult Stool Screen #2    Blood Occult Stool Screen #3    H. Pylori Breath Test, Adult           Cardiovascular Disease Risk Counseling: Assessed the patient's risk to develop cardiovascular disease and reviewed main risk factors. Reviewed steps to reduce disease risk including:   · Quitting tobacco use, reducing amount smoked, or not starting the habit  · Making healthy food choices  · Being physically active and gradualy increasing activity levels   · Reduce weight and determine a healthy BMI goal  · Monitor blood pressure and treat if higher than 140/90 mmHg  · Maintain blood total cholesterol levels under 5 mmol/l or 190 mg/dl  · Maintain LDL cholesterol levels under 3.0 mmol/l or 115 mg/dl   · Control blood glucose levels  · Consider taking aspirin (75 mg daily), once blood pressure is controlled   Provided a follow up plan. Time spent (minutes): 5 minutes.

## 2021-09-01 ENCOUNTER — TELEPHONE (OUTPATIENT)
Dept: INTERNAL MEDICINE CLINIC | Age: 86
End: 2021-09-01

## 2021-09-03 ENCOUNTER — NURSE ONLY (OUTPATIENT)
Dept: INTERNAL MEDICINE CLINIC | Age: 86
End: 2021-09-03
Payer: MEDICARE

## 2021-09-03 DIAGNOSIS — I10 ESSENTIAL HYPERTENSION: Primary | ICD-10-CM

## 2021-09-03 LAB
CONTROL: NORMAL
HEMOCCULT STL QL: NEGATIVE

## 2021-09-03 PROCEDURE — 82274 ASSAY TEST FOR BLOOD FECAL: CPT | Performed by: NURSE PRACTITIONER

## 2022-03-15 ENCOUNTER — OFFICE VISIT (OUTPATIENT)
Dept: INTERNAL MEDICINE CLINIC | Age: 87
End: 2022-03-15
Payer: MEDICARE

## 2022-03-15 VITALS
OXYGEN SATURATION: 97 % | RESPIRATION RATE: 16 BRPM | WEIGHT: 132.4 LBS | HEIGHT: 62 IN | HEART RATE: 69 BPM | SYSTOLIC BLOOD PRESSURE: 118 MMHG | DIASTOLIC BLOOD PRESSURE: 74 MMHG | BODY MASS INDEX: 24.37 KG/M2

## 2022-03-15 DIAGNOSIS — I10 ESSENTIAL HYPERTENSION: ICD-10-CM

## 2022-03-15 DIAGNOSIS — Z76.89 ENCOUNTER TO ESTABLISH CARE: Primary | ICD-10-CM

## 2022-03-15 PROCEDURE — 99213 OFFICE O/P EST LOW 20 MIN: CPT | Performed by: NURSE PRACTITIONER

## 2022-03-15 PROCEDURE — G8420 CALC BMI NORM PARAMETERS: HCPCS | Performed by: NURSE PRACTITIONER

## 2022-03-15 PROCEDURE — 1090F PRES/ABSN URINE INCON ASSESS: CPT | Performed by: NURSE PRACTITIONER

## 2022-03-15 PROCEDURE — G8427 DOCREV CUR MEDS BY ELIG CLIN: HCPCS | Performed by: NURSE PRACTITIONER

## 2022-03-15 PROCEDURE — 1123F ACP DISCUSS/DSCN MKR DOCD: CPT | Performed by: NURSE PRACTITIONER

## 2022-03-15 PROCEDURE — G8484 FLU IMMUNIZE NO ADMIN: HCPCS | Performed by: NURSE PRACTITIONER

## 2022-03-15 PROCEDURE — 1036F TOBACCO NON-USER: CPT | Performed by: NURSE PRACTITIONER

## 2022-03-15 PROCEDURE — 4040F PNEUMOC VAC/ADMIN/RCVD: CPT | Performed by: NURSE PRACTITIONER

## 2022-03-15 ASSESSMENT — ENCOUNTER SYMPTOMS
VOMITING: 0
CONSTIPATION: 0
SHORTNESS OF BREATH: 0
NAUSEA: 0
DIARRHEA: 0
COUGH: 0

## 2022-03-15 NOTE — PROGRESS NOTES
Date: 3/15/2022                                               Subjective/Objective:     Chief Complaint   Patient presents with    Established New Doctor       KELLI     Court Neal is a 81 yo female, visit today to establish care. Former patient of [email or web address]. Denies concerns. Lives alone. No falls. Enjoys playing cards. History of hypertension and hyperlipidemia controlled with diet and lifestyle. She denies chest pain, shortness of breath and headache. Patient Active Problem List    Diagnosis Date Noted    Cataract 02/24/2016    Essential hypertension 05/16/2014    Mixed hyperlipidemia 05/16/2014    Nasal polyps 10/03/2013    Diverticulosis     Other malaise and fatigue 09/01/2009    Contact dermatitis and other eczema, due to unspecified cause 07/08/2009       Past Medical History:   Diagnosis Date    Cataract 2/24/2016    Diverticulosis     Essential hypertension 5/16/2014    HTN (hypertension)     Mixed hyperlipidemia 5/16/2014       Past Surgical History:   Procedure Laterality Date    CATARACT REMOVAL Bilateral     NASAL POLYP SURGERY         Nurse Only on 09/03/2021   Component Date Value Ref Range Status    OCCULT BLOOD FECAL 09/03/2021 negative   Final       Family History   Problem Relation Age of Onset    Cancer Mother         breast cancer    Heart Attack Father 52       Current Outpatient Medications   Medication Sig Dispense Refill    VITAMIN D PO Take by mouth      ZINC PO Take by mouth       No current facility-administered medications for this visit. No Known Allergies    Review of Systems   Constitutional: Negative for appetite change, chills and fever. Respiratory: Negative for cough and shortness of breath. Cardiovascular: Negative for chest pain and leg swelling. Gastrointestinal: Negative for constipation, diarrhea, nausea and vomiting. Genitourinary: Negative for difficulty urinating. Musculoskeletal: Negative. Neurological: Negative for dizziness and syncope. All other systems reviewed and are negative. Vitals:  /74 (Site: Left Upper Arm, Position: Sitting, Cuff Size: Medium Adult)   Pulse 69   Resp 16   Ht 5' 2\" (1.575 m)   Wt 132 lb 6.4 oz (60.1 kg)   SpO2 97%   Breastfeeding No   BMI 24.22 kg/m²     Physical Exam  Vitals reviewed. Constitutional:       General: She is not in acute distress. Appearance: Normal appearance. HENT:      Head: Normocephalic and atraumatic. Right Ear: Tympanic membrane, ear canal and external ear normal.      Left Ear: Tympanic membrane, ear canal and external ear normal.   Cardiovascular:      Rate and Rhythm: Normal rate and regular rhythm. Pulses: Normal pulses. Heart sounds: Murmur heard. Pulmonary:      Effort: Pulmonary effort is normal. No respiratory distress. Breath sounds: Normal breath sounds. No wheezing. Abdominal:      General: Bowel sounds are normal. There is no distension. Palpations: Abdomen is soft. Tenderness: There is no abdominal tenderness. Musculoskeletal:      Right lower leg: No edema. Left lower leg: No edema. Skin:     General: Skin is warm and dry. Neurological:      General: No focal deficit present. Mental Status: She is alert and oriented to person, place, and time. Mental status is at baseline. Psychiatric:         Mood and Affect: Mood normal.         Behavior: Behavior normal.         Thought Content: Thought content normal.         Judgment: Judgment normal.         Assessment/Plan     1. Encounter to establish care  - Declines further mammograms  - Declines pneumovax, Shingrix  - Has order for DEXA, declines    2. Essential hypertension: stable   - Continue with lifestyle measures      No orders of the defined types were placed in this encounter. Return in about 6 months (around 9/15/2022).  OR sooner with questions, concerns, worsening symptoms    DAVID PALOMARES APRN, NP    3/15/2022  12:49 PM    Discussed use, benefit, and side effects of prescribed medications. Barriers to medication compliance addressed. Discussed all ordered testing and labs. All patient questions answered. Patient agreeable with plan above. Please note that this chart was generated using dragon dictation software. Although every effort was made to ensure the accuracy of this automated transcription, some errors in transcription may have occurred.

## 2022-08-02 ENCOUNTER — OFFICE VISIT (OUTPATIENT)
Dept: INTERNAL MEDICINE CLINIC | Age: 87
End: 2022-08-02
Payer: MEDICARE

## 2022-08-02 VITALS
HEART RATE: 57 BPM | WEIGHT: 132 LBS | OXYGEN SATURATION: 98 % | BODY MASS INDEX: 24.14 KG/M2 | DIASTOLIC BLOOD PRESSURE: 70 MMHG | SYSTOLIC BLOOD PRESSURE: 142 MMHG | TEMPERATURE: 97.9 F

## 2022-08-02 DIAGNOSIS — R11.11 INTRACTABLE VOMITING WITHOUT NAUSEA, UNSPECIFIED VOMITING TYPE: ICD-10-CM

## 2022-08-02 DIAGNOSIS — I10 ESSENTIAL HYPERTENSION: Primary | ICD-10-CM

## 2022-08-02 DIAGNOSIS — E78.2 MIXED HYPERLIPIDEMIA: ICD-10-CM

## 2022-08-02 DIAGNOSIS — R01.1 SYSTOLIC MURMUR: ICD-10-CM

## 2022-08-02 LAB
ALBUMIN SERPL-MCNC: 3.6 G/DL (ref 3.4–5)
ALP BLD-CCNC: 85 U/L (ref 40–129)
ALT SERPL-CCNC: 9 U/L (ref 10–40)
ANION GAP SERPL CALCULATED.3IONS-SCNC: 11 MMOL/L (ref 3–16)
AST SERPL-CCNC: 16 U/L (ref 15–37)
BILIRUB SERPL-MCNC: 0.3 MG/DL (ref 0–1)
BILIRUBIN DIRECT: <0.2 MG/DL (ref 0–0.3)
BILIRUBIN, INDIRECT: ABNORMAL MG/DL (ref 0–1)
BUN BLDV-MCNC: 18 MG/DL (ref 7–20)
CALCIUM SERPL-MCNC: 8.8 MG/DL (ref 8.3–10.6)
CHLORIDE BLD-SCNC: 103 MMOL/L (ref 99–110)
CHOLESTEROL, FASTING: 173 MG/DL (ref 0–199)
CO2: 27 MMOL/L (ref 21–32)
CREAT SERPL-MCNC: 0.7 MG/DL (ref 0.6–1.2)
GFR AFRICAN AMERICAN: >60
GFR NON-AFRICAN AMERICAN: >60
GLUCOSE BLD-MCNC: 87 MG/DL (ref 70–99)
HDLC SERPL-MCNC: 65 MG/DL (ref 40–60)
LDL CHOLESTEROL CALCULATED: 89 MG/DL
POTASSIUM SERPL-SCNC: 4.5 MMOL/L (ref 3.5–5.1)
SODIUM BLD-SCNC: 141 MMOL/L (ref 136–145)
TOTAL PROTEIN: 7 G/DL (ref 6.4–8.2)
TRIGLYCERIDE, FASTING: 94 MG/DL (ref 0–150)
VLDLC SERPL CALC-MCNC: 19 MG/DL

## 2022-08-02 PROCEDURE — 1090F PRES/ABSN URINE INCON ASSESS: CPT | Performed by: NURSE PRACTITIONER

## 2022-08-02 PROCEDURE — 99214 OFFICE O/P EST MOD 30 MIN: CPT | Performed by: NURSE PRACTITIONER

## 2022-08-02 PROCEDURE — 1036F TOBACCO NON-USER: CPT | Performed by: NURSE PRACTITIONER

## 2022-08-02 PROCEDURE — G8427 DOCREV CUR MEDS BY ELIG CLIN: HCPCS | Performed by: NURSE PRACTITIONER

## 2022-08-02 PROCEDURE — G8420 CALC BMI NORM PARAMETERS: HCPCS | Performed by: NURSE PRACTITIONER

## 2022-08-02 PROCEDURE — 36415 COLL VENOUS BLD VENIPUNCTURE: CPT | Performed by: NURSE PRACTITIONER

## 2022-08-02 PROCEDURE — 1123F ACP DISCUSS/DSCN MKR DOCD: CPT | Performed by: NURSE PRACTITIONER

## 2022-08-02 RX ORDER — PANTOPRAZOLE SODIUM 20 MG/1
20 TABLET, DELAYED RELEASE ORAL
Qty: 90 TABLET | Refills: 0 | Status: SHIPPED | OUTPATIENT
Start: 2022-08-02

## 2022-08-02 ASSESSMENT — ENCOUNTER SYMPTOMS
ABDOMINAL PAIN: 0
CONSTIPATION: 0
DIARRHEA: 0
COUGH: 0
VOMITING: 1
SHORTNESS OF BREATH: 0
NAUSEA: 0

## 2022-08-02 ASSESSMENT — PATIENT HEALTH QUESTIONNAIRE - PHQ9
SUM OF ALL RESPONSES TO PHQ QUESTIONS 1-9: 0
2. FEELING DOWN, DEPRESSED OR HOPELESS: 0
SUM OF ALL RESPONSES TO PHQ QUESTIONS 1-9: 0
1. LITTLE INTEREST OR PLEASURE IN DOING THINGS: 0
SUM OF ALL RESPONSES TO PHQ9 QUESTIONS 1 & 2: 0

## 2022-08-02 NOTE — PROGRESS NOTES
Date: 8/2/2022                                               Subjective/Objective:     Chief Complaint   Patient presents with    Hypertension     4 month follow up       HPI    Charles Iverson is a 79 yo female, visit today for follow up on hypertension. She was last seen 3/15/2022. Recently returned from a trip to [place]. She is a history of hypertension and hyperlipidemia controlled with diet and lifestyle. She denies chest pain, shortness of breath and headaches. She lives alone, denies any falls. About a month ago she woke up feeling a little queasy, vomited once and felt better. She has had this happen four times since then. She reports her appetite is normal and doesn't \"feel bad\" when this happens. Emesis is a clear liquid. This has only occurred in the morning. No abdominal pain. No constipation, diarrhea. She was told many years ago that she does have a heart murmur. She has never had an echocardiogram to her knowledge.            Patient Active Problem List    Diagnosis Date Noted    Cataract 02/24/2016    Essential hypertension 05/16/2014    Mixed hyperlipidemia 05/16/2014    Nasal polyps 10/03/2013    Diverticulosis     Other malaise and fatigue 09/01/2009    Contact dermatitis and other eczema, due to unspecified cause 07/08/2009       Past Medical History:   Diagnosis Date    Cataract 2/24/2016    Diverticulosis     Essential hypertension 5/16/2014    HTN (hypertension)     Mixed hyperlipidemia 5/16/2014       Past Surgical History:   Procedure Laterality Date    CATARACT REMOVAL Bilateral     NASAL POLYP SURGERY         Nurse Only on 09/03/2021   Component Date Value Ref Range Status    OCCULT BLOOD FECAL 09/03/2021 negative   Final       Family History   Problem Relation Age of Onset    Cancer Mother         breast cancer    Heart Attack Father 52       Current Outpatient Medications   Medication Sig Dispense Refill    pantoprazole (PROTONIX) 20 MG tablet Take 1 tablet by mouth every morning (before breakfast) 90 tablet 0    VITAMIN D PO Take by mouth      ZINC PO Take by mouth       No current facility-administered medications for this visit. No Known Allergies    Review of Systems   Constitutional:  Negative for chills and fever. HENT:  Negative for postnasal drip. Respiratory:  Negative for cough and shortness of breath. Cardiovascular:  Negative for chest pain. Gastrointestinal:  Positive for vomiting. Negative for abdominal pain, constipation, diarrhea and nausea. Genitourinary:  Negative for difficulty urinating. Neurological:  Negative for dizziness and syncope. Psychiatric/Behavioral:  Negative for sleep disturbance. Vitals:  BP (!) 142/70   Pulse 57   Temp 97.9 °F (36.6 °C) (Oral)   Wt 132 lb (59.9 kg)   SpO2 98%   BMI 24.14 kg/m²     Physical Exam  Vitals reviewed. Constitutional:       General: She is not in acute distress. HENT:      Head: Normocephalic and atraumatic. Cardiovascular:      Rate and Rhythm: Normal rate and regular rhythm. Heart sounds: Murmur heard. Pulmonary:      Effort: Pulmonary effort is normal. No respiratory distress. Breath sounds: Normal breath sounds. No wheezing. Abdominal:      General: Bowel sounds are normal. There is no distension. Palpations: Abdomen is soft. Tenderness: There is no abdominal tenderness. Neurological:      General: No focal deficit present. Mental Status: She is alert and oriented to person, place, and time. Psychiatric:         Mood and Affect: Mood normal.         Behavior: Behavior normal.         Thought Content: Thought content normal.         Judgment: Judgment normal.       Assessment/Plan     1. Essential hypertension: uncontrolled, above goal. Goal <130/80.  - Basic Metabolic Panel; Future   -Patient admits that she does eat a lot of salt, add salt to most of her food. She will work on reducing her salt intake. She will follow-up in 3 months time.     2. Mixed hyperlipidemia: diet controlled  - Lipid, Fasting; Future  - Hepatic Function Panel; Future   -Check fasting lipid panel and adjust plan as necessary    3. Intractable vomiting without nausea, unspecified vomiting type: Vomiting of clear emesis in the morning without associated nausea or abdominal pain. Appetite is normal.  - pantoprazole (PROTONIX) 20 MG tablet; Take 1 tablet by mouth every morning (before breakfast)  Dispense: 90 tablet; Refill: 0   -Possible GERD, will trial Protonix. If symptoms resolve with Protonix she will continue for 8 weeks and then stop. If symptoms return she is let me know. If symptoms do not resolve or worsen on Protonix she is to let me know. Medication education provided. 4. Systolic murmur: Without chest pain or shortness of breath. - ECHO 2D WO Color Doppler Complete; Future    Orders Placed This Encounter   Procedures    Basic Metabolic Panel     Standing Status:   Future     Number of Occurrences:   1     Standing Expiration Date:   8/2/2023    Lipid, Fasting     Standing Status:   Future     Number of Occurrences:   1     Standing Expiration Date:   8/2/2023    Hepatic Function Panel     Standing Status:   Future     Number of Occurrences:   1     Standing Expiration Date:   8/2/2023    ECHO 2D WO Color Doppler Complete     Standing Status:   Future     Standing Expiration Date:   8/2/2023     Order Specific Question:   Reason for exam:     Answer:   systolic murmur         Return in about 3 months (around 11/2/2022) for hypertension. OR sooner with questions, concerns, worsening symptoms    MAY SUTTON  8/2/2022  11:02 AM    Discussed use, benefit, and side effects of prescribed medications. Barriers to medication compliance addressed. Discussed all ordered testing and labs. All patient questions answered. Patient agreeable with plan above. Please note that this chart was generated using dragon dictation software.   Although every effort was made to ensure the accuracy of this automated transcription, some errors in transcription may have occurred.

## 2022-08-02 NOTE — PATIENT INSTRUCTIONS
Start Protonix, take every morning with sip of water, at least 30 minutes before other food or drink. If symptoms resolve, stop protonix in 8 weeks. If they return, let me know. If symptoms worsen or do not resolve, let me know. Lifestyle modifications for hypertension:  -If you are overweight, weight loss can help lower your blood pressure   -Consume a diet rich in fruits, vegetables, whole grains, and low-fat dairy products, with reduced content of saturated and total fat  -Reduce your intake of sodium in diet. Avoid high salt foods and beverages. Goal sodium intake is less than 1.5 g of sodium a day  -Physical activity; goal is 150 minutes of moderate intensity exercise per week. -If you drink alcohol, reduce your alcohol intake. Goal for men is less than 2 drinks per day, goal for women is less than 1 drink per day. -If you smoke cigarettes, stop smoking.

## 2022-10-27 ENCOUNTER — IMMUNIZATION (OUTPATIENT)
Dept: INTERNAL MEDICINE CLINIC | Age: 87
End: 2022-10-27
Payer: MEDICARE

## 2022-10-27 PROCEDURE — 90694 VACC AIIV4 NO PRSRV 0.5ML IM: CPT | Performed by: NURSE PRACTITIONER

## 2022-10-27 PROCEDURE — G0008 ADMIN INFLUENZA VIRUS VAC: HCPCS | Performed by: NURSE PRACTITIONER

## 2022-10-28 ENCOUNTER — TELEPHONE (OUTPATIENT)
Dept: INTERNAL MEDICINE CLINIC | Age: 87
End: 2022-10-28

## 2022-10-28 NOTE — TELEPHONE ENCOUNTER
Left message on Malcolm HARTMAN that she is due for an appt in November anyway. Please call and sched appt.

## 2022-10-28 NOTE — TELEPHONE ENCOUNTER
----- Message from Hurley sent at 10/25/2022 11:12 AM EDT -----  Subject: Message to Provider    QUESTIONS  Information for Provider? PT called to see if she was suppose to see MARIAM Grissom before or after appt for Echo on 11/22? Please f/u.  ---------------------------------------------------------------------------  --------------  Gerda Portillo INFO  [phone]; OK to leave message on voicemail  ---------------------------------------------------------------------------  --------------  SCRIPT ANSWERS  Relationship to Patient?  Self

## 2022-11-22 ENCOUNTER — HOSPITAL ENCOUNTER (OUTPATIENT)
Dept: NON INVASIVE DIAGNOSTICS | Age: 87
Discharge: HOME | End: 2022-11-22
Payer: MEDICARE

## 2022-11-22 DIAGNOSIS — R01.1 SYSTOLIC MURMUR: ICD-10-CM

## 2022-11-22 PROCEDURE — 93306 TTE W/DOPPLER COMPLETE: CPT

## 2022-12-05 ENCOUNTER — OFFICE VISIT (OUTPATIENT)
Dept: INTERNAL MEDICINE CLINIC | Age: 87
End: 2022-12-05
Payer: MEDICARE

## 2022-12-05 VITALS
TEMPERATURE: 97.3 F | WEIGHT: 130.6 LBS | DIASTOLIC BLOOD PRESSURE: 68 MMHG | RESPIRATION RATE: 18 BRPM | SYSTOLIC BLOOD PRESSURE: 131 MMHG | HEIGHT: 62 IN | BODY MASS INDEX: 24.03 KG/M2 | OXYGEN SATURATION: 98 % | HEART RATE: 83 BPM

## 2022-12-05 DIAGNOSIS — J06.9 VIRAL UPPER RESPIRATORY TRACT INFECTION: Primary | ICD-10-CM

## 2022-12-05 LAB
RAPID INFLUENZA  B AGN: NEGATIVE
RAPID INFLUENZA A AGN: NEGATIVE

## 2022-12-05 PROCEDURE — G8428 CUR MEDS NOT DOCUMENT: HCPCS | Performed by: STUDENT IN AN ORGANIZED HEALTH CARE EDUCATION/TRAINING PROGRAM

## 2022-12-05 PROCEDURE — 99213 OFFICE O/P EST LOW 20 MIN: CPT | Performed by: STUDENT IN AN ORGANIZED HEALTH CARE EDUCATION/TRAINING PROGRAM

## 2022-12-05 PROCEDURE — 1123F ACP DISCUSS/DSCN MKR DOCD: CPT | Performed by: STUDENT IN AN ORGANIZED HEALTH CARE EDUCATION/TRAINING PROGRAM

## 2022-12-05 PROCEDURE — G8484 FLU IMMUNIZE NO ADMIN: HCPCS | Performed by: STUDENT IN AN ORGANIZED HEALTH CARE EDUCATION/TRAINING PROGRAM

## 2022-12-05 PROCEDURE — 1090F PRES/ABSN URINE INCON ASSESS: CPT | Performed by: STUDENT IN AN ORGANIZED HEALTH CARE EDUCATION/TRAINING PROGRAM

## 2022-12-05 PROCEDURE — G8420 CALC BMI NORM PARAMETERS: HCPCS | Performed by: STUDENT IN AN ORGANIZED HEALTH CARE EDUCATION/TRAINING PROGRAM

## 2022-12-05 PROCEDURE — 1036F TOBACCO NON-USER: CPT | Performed by: STUDENT IN AN ORGANIZED HEALTH CARE EDUCATION/TRAINING PROGRAM

## 2022-12-05 RX ORDER — GUAIFENESIN AND CODEINE PHOSPHATE 100; 10 MG/5ML; MG/5ML
5 SOLUTION ORAL 3 TIMES DAILY PRN
Qty: 180 ML | Refills: 0 | Status: SHIPPED | OUTPATIENT
Start: 2022-12-05 | End: 2022-12-17

## 2022-12-05 SDOH — ECONOMIC STABILITY: FOOD INSECURITY: WITHIN THE PAST 12 MONTHS, YOU WORRIED THAT YOUR FOOD WOULD RUN OUT BEFORE YOU GOT MONEY TO BUY MORE.: NEVER TRUE

## 2022-12-05 SDOH — ECONOMIC STABILITY: FOOD INSECURITY: WITHIN THE PAST 12 MONTHS, THE FOOD YOU BOUGHT JUST DIDN'T LAST AND YOU DIDN'T HAVE MONEY TO GET MORE.: NEVER TRUE

## 2022-12-05 ASSESSMENT — ENCOUNTER SYMPTOMS
CHEST TIGHTNESS: 0
ABDOMINAL PAIN: 0
COUGH: 1
BACK PAIN: 0
DIARRHEA: 0
CONSTIPATION: 0
SORE THROAT: 0
NAUSEA: 0
VOMITING: 0

## 2022-12-05 ASSESSMENT — SOCIAL DETERMINANTS OF HEALTH (SDOH): HOW HARD IS IT FOR YOU TO PAY FOR THE VERY BASICS LIKE FOOD, HOUSING, MEDICAL CARE, AND HEATING?: NOT HARD AT ALL

## 2022-12-05 NOTE — PATIENT INSTRUCTIONS
Take Guaifenesin with codeine as needed for cough     medications with decongestant at pharmacy. Looking for medication with a D.  Example: Claritin-D or allegra-D    I will call you with results of flu and covid test

## 2022-12-05 NOTE — PROGRESS NOTES
[facility]) Internal Medicine    Mrs. Ayde Wing is an 80-year-old female with a past medical history as listed below who presents for an acute visit for URI symptoms    URI: Patient developed back aches over the weekend. She also had fatigue. Patient with coughing that started over the weekend. Her cough is dry and worse at night. Patient reports she feels like she has flu. She feels like she may be getting better. Her back is hurting less. Review of Systems   Constitutional:  Positive for fever. Negative for chills and fatigue. HENT:  Negative for congestion, ear pain and sore throat. Respiratory:  Positive for cough. Negative for chest tightness. Cardiovascular:  Negative for chest pain, palpitations and leg swelling. Gastrointestinal:  Negative for abdominal pain, constipation, diarrhea, nausea and vomiting. Genitourinary:  Negative for dysuria, flank pain and urgency. Musculoskeletal:  Negative for arthralgias, back pain and joint swelling. Skin:  Negative for rash. Neurological:  Negative for dizziness, weakness and numbness. Psychiatric/Behavioral:  Negative for sleep disturbance. The patient is not nervous/anxious. Past Medical History:   Diagnosis Date    Cataract 2/24/2016    Diverticulosis     Essential hypertension 5/16/2014    HTN (hypertension)     Mixed hyperlipidemia 5/16/2014       Past Surgical History:   Procedure Laterality Date    CATARACT REMOVAL Bilateral     NASAL POLYP SURGERY         Social History     Socioeconomic History    Marital status:      Spouse name: Not on file    Number of children: Not on file    Years of education: Not on file    Highest education level: Not on file   Occupational History    Not on file   Tobacco Use    Smoking status: Never    Smokeless tobacco: Never   Substance and Sexual Activity    Alcohol use:  Yes     Alcohol/week: 14.0 standard drinks     Types: 7 Standard drinks or equivalent, 7 Glasses of wine per week    Drug use: No    Sexual activity: Not on file   Other Topics Concern    Not on file   Social History Narrative    Not on file     Social Determinants of Health     Financial Resource Strain: Low Risk     Difficulty of Paying Living Expenses: Not hard at all   Food Insecurity: No Food Insecurity    Worried About Running Out of Food in the Last Year: Never true    [address] in the Last Year: Never true   Transportation Needs: No Transportation Needs    Lack of Transportation (Medical): No    Lack of Transportation (Non-Medical): No   Physical Activity: Not on file   Stress: Not on file   Social Connections: Not on file   Intimate Partner Violence: Not on file   Housing Stability: Not on file       Family History   Problem Relation Age of Onset    Cancer Mother         breast cancer    Heart Attack Father 52         Current Outpatient Medications:     guaiFENesin-codeine (TUSSI-ORGANIDIN NR) 100-10 MG/5ML syrup, Take 5 mLs by mouth 3 times daily as needed for Cough for up to 12 days. , Disp: 180 mL, Rfl: 0    pantoprazole (PROTONIX) 20 MG tablet, Take 1 tablet by mouth every morning (before breakfast), Disp: 90 tablet, Rfl: 0    VITAMIN D PO, Take by mouth, Disp: , Rfl:     ZINC PO, Take by mouth, Disp: , Rfl:      Examination  Vitals:    12/05/22 1516   BP: 131/68   Site: Left Upper Arm   Position: Sitting   Cuff Size: Medium Adult   Pulse: 83   Resp: 18   Temp: 97.3 °F (36.3 °C)   SpO2: 98%   Weight: 130 lb 9.6 oz (59.2 kg)   Height: 5' 2\" (1.575 m)      Physical Exam  Constitutional:       General: She is not in acute distress. HENT:      Mouth/Throat:      Mouth: Mucous membranes are moist.   Eyes:      General: No scleral icterus. Pupils: Pupils are equal, round, and reactive to light. Cardiovascular:      Rate and Rhythm: Normal rate and regular rhythm. Pulses: Normal pulses. Heart sounds: No murmur heard. No gallop.    Pulmonary:      Effort: Pulmonary effort is normal.      Breath sounds: Rales (Worse in lower lungs) present. Abdominal:      General: Abdomen is flat. Bowel sounds are normal. There is no distension. Palpations: Abdomen is soft. Tenderness: There is no abdominal tenderness. Musculoskeletal:      Right lower leg: No edema. Left lower leg: No edema. Skin:     General: Skin is warm and dry. Findings: No erythema or rash. Neurological:      General: No focal deficit present. Mental Status: She is alert and oriented to person, place, and time. Psychiatric:         Mood and Affect: Mood normal.         Behavior: Behavior normal.        Assessment and Plan  Viral upper respiratory tract infection  - Test for flu and COVID  -Symptomatic control with guaifenesin with codeine and Claritin-D       Discussed use, benefit, and side effects of prescribed medications. Barriers to medication compliance addressed. Discussed all ordered testing and labs. All patient questions answered. Patient agreeable with plan above. Please note that this chart was generated using dragon dictation software. Although every effort was made to ensure the accuracy of this automated transcription, some errors in transcription may have occurred.

## 2022-12-06 ENCOUNTER — TELEPHONE (OUTPATIENT)
Dept: INTERNAL MEDICINE CLINIC | Age: 87
End: 2022-12-06

## 2022-12-06 LAB — SARS-COV-2, PCR: NOT DETECTED

## 2022-12-13 ENCOUNTER — APPOINTMENT (OUTPATIENT)
Dept: GENERAL RADIOLOGY | Age: 87
End: 2022-12-13
Payer: MEDICARE

## 2022-12-13 ENCOUNTER — OFFICE VISIT (OUTPATIENT)
Dept: INTERNAL MEDICINE CLINIC | Age: 87
End: 2022-12-13
Payer: MEDICARE

## 2022-12-13 ENCOUNTER — HOSPITAL ENCOUNTER (EMERGENCY)
Age: 87
Discharge: HOME | End: 2022-12-13
Attending: EMERGENCY MEDICINE
Payer: MEDICARE

## 2022-12-13 VITALS
WEIGHT: 130.07 LBS | OXYGEN SATURATION: 98 % | SYSTOLIC BLOOD PRESSURE: 138 MMHG | DIASTOLIC BLOOD PRESSURE: 54 MMHG | BODY MASS INDEX: 23.79 KG/M2 | HEART RATE: 68 BPM | TEMPERATURE: 98.9 F | RESPIRATION RATE: 20 BRPM

## 2022-12-13 VITALS
TEMPERATURE: 98.8 F | SYSTOLIC BLOOD PRESSURE: 138 MMHG | DIASTOLIC BLOOD PRESSURE: 84 MMHG | BODY MASS INDEX: 23.59 KG/M2 | WEIGHT: 129 LBS | HEART RATE: 140 BPM | OXYGEN SATURATION: 90 %

## 2022-12-13 DIAGNOSIS — R06.09 DOE (DYSPNEA ON EXERTION): Primary | ICD-10-CM

## 2022-12-13 DIAGNOSIS — R06.02 SHORTNESS OF BREATH: Primary | ICD-10-CM

## 2022-12-13 DIAGNOSIS — R06.2 WHEEZING: ICD-10-CM

## 2022-12-13 LAB
A/G RATIO: 0.7 (ref 1.1–2.2)
ALBUMIN SERPL-MCNC: 3.1 G/DL (ref 3.4–5)
ALP BLD-CCNC: 84 U/L (ref 40–129)
ALT SERPL-CCNC: 8 U/L (ref 10–40)
ANION GAP SERPL CALCULATED.3IONS-SCNC: 10 MMOL/L (ref 3–16)
AST SERPL-CCNC: 19 U/L (ref 15–37)
BASOPHILS ABSOLUTE: 0 K/UL (ref 0–0.2)
BASOPHILS RELATIVE PERCENT: 0.4 %
BILIRUB SERPL-MCNC: <0.2 MG/DL (ref 0–1)
BUN BLDV-MCNC: 11 MG/DL (ref 7–20)
CALCIUM SERPL-MCNC: 9.1 MG/DL (ref 8.3–10.6)
CHLORIDE BLD-SCNC: 100 MMOL/L (ref 99–110)
CO2: 29 MMOL/L (ref 21–32)
CREAT SERPL-MCNC: 0.5 MG/DL (ref 0.6–1.2)
EOSINOPHILS ABSOLUTE: 0.1 K/UL (ref 0–0.6)
EOSINOPHILS RELATIVE PERCENT: 0.9 %
GFR SERPL CREATININE-BSD FRML MDRD: >60 ML/MIN/{1.73_M2}
GLUCOSE BLD-MCNC: 100 MG/DL (ref 70–99)
HCT VFR BLD CALC: 38.5 % (ref 36–48)
HEMOGLOBIN: 12.3 G/DL (ref 12–16)
LYMPHOCYTES ABSOLUTE: 1.5 K/UL (ref 1–5.1)
LYMPHOCYTES RELATIVE PERCENT: 14 %
MCH RBC QN AUTO: 28.4 PG (ref 26–34)
MCHC RBC AUTO-ENTMCNC: 32.1 G/DL (ref 31–36)
MCV RBC AUTO: 88.5 FL (ref 80–100)
MONOCYTES ABSOLUTE: 0.8 K/UL (ref 0–1.3)
MONOCYTES RELATIVE PERCENT: 7.5 %
NEUTROPHILS ABSOLUTE: 8.5 K/UL (ref 1.7–7.7)
NEUTROPHILS RELATIVE PERCENT: 77.2 %
PDW BLD-RTO: 14 % (ref 12.4–15.4)
PLATELET # BLD: 451 K/UL (ref 135–450)
PMV BLD AUTO: 7 FL (ref 5–10.5)
POTASSIUM SERPL-SCNC: 3.5 MMOL/L (ref 3.5–5.1)
PRO-BNP: 160 PG/ML (ref 0–449)
RBC # BLD: 4.35 M/UL (ref 4–5.2)
SODIUM BLD-SCNC: 139 MMOL/L (ref 136–145)
TOTAL PROTEIN: 7.6 G/DL (ref 6.4–8.2)
WBC # BLD: 11 K/UL (ref 4–11)

## 2022-12-13 PROCEDURE — 1123F ACP DISCUSS/DSCN MKR DOCD: CPT | Performed by: NURSE PRACTITIONER

## 2022-12-13 PROCEDURE — G8420 CALC BMI NORM PARAMETERS: HCPCS | Performed by: NURSE PRACTITIONER

## 2022-12-13 PROCEDURE — 99214 OFFICE O/P EST MOD 30 MIN: CPT | Performed by: NURSE PRACTITIONER

## 2022-12-13 PROCEDURE — 94640 AIRWAY INHALATION TREATMENT: CPT

## 2022-12-13 PROCEDURE — 99285 EMERGENCY DEPT VISIT HI MDM: CPT

## 2022-12-13 PROCEDURE — 71046 X-RAY EXAM CHEST 2 VIEWS: CPT

## 2022-12-13 PROCEDURE — 83880 ASSAY OF NATRIURETIC PEPTIDE: CPT

## 2022-12-13 PROCEDURE — 6370000000 HC RX 637 (ALT 250 FOR IP): Performed by: EMERGENCY MEDICINE

## 2022-12-13 PROCEDURE — 93005 ELECTROCARDIOGRAM TRACING: CPT | Performed by: EMERGENCY MEDICINE

## 2022-12-13 PROCEDURE — 1090F PRES/ABSN URINE INCON ASSESS: CPT | Performed by: NURSE PRACTITIONER

## 2022-12-13 PROCEDURE — G8484 FLU IMMUNIZE NO ADMIN: HCPCS | Performed by: NURSE PRACTITIONER

## 2022-12-13 PROCEDURE — 85025 COMPLETE CBC W/AUTO DIFF WBC: CPT

## 2022-12-13 PROCEDURE — 80053 COMPREHEN METABOLIC PANEL: CPT

## 2022-12-13 PROCEDURE — 1036F TOBACCO NON-USER: CPT | Performed by: NURSE PRACTITIONER

## 2022-12-13 PROCEDURE — G8427 DOCREV CUR MEDS BY ELIG CLIN: HCPCS | Performed by: NURSE PRACTITIONER

## 2022-12-13 RX ORDER — PREDNISONE 10 MG/1
TABLET ORAL
Qty: 30 TABLET | Refills: 0 | Status: CANCELLED | OUTPATIENT
Start: 2022-12-13 | End: 2022-12-23

## 2022-12-13 RX ORDER — ALBUTEROL SULFATE 90 UG/1
2 AEROSOL, METERED RESPIRATORY (INHALATION) 4 TIMES DAILY PRN
Qty: 18 G | Refills: 0 | Status: SHIPPED | OUTPATIENT
Start: 2022-12-13

## 2022-12-13 RX ORDER — ALBUTEROL SULFATE 90 UG/1
2 AEROSOL, METERED RESPIRATORY (INHALATION) 4 TIMES DAILY PRN
Qty: 54 G | Refills: 1 | Status: CANCELLED | OUTPATIENT
Start: 2022-12-13

## 2022-12-13 RX ORDER — IPRATROPIUM BROMIDE AND ALBUTEROL SULFATE 2.5; .5 MG/3ML; MG/3ML
1 SOLUTION RESPIRATORY (INHALATION) ONCE
Status: COMPLETED | OUTPATIENT
Start: 2022-12-13 | End: 2022-12-13

## 2022-12-13 RX ADMIN — IPRATROPIUM BROMIDE AND ALBUTEROL SULFATE 1 AMPULE: 2.5; .5 SOLUTION RESPIRATORY (INHALATION) at 16:55

## 2022-12-13 ASSESSMENT — ENCOUNTER SYMPTOMS
WHEEZING: 1
VOMITING: 0
SHORTNESS OF BREATH: 1
DIARRHEA: 0
COUGH: 1
NAUSEA: 0

## 2022-12-13 ASSESSMENT — PAIN - FUNCTIONAL ASSESSMENT: PAIN_FUNCTIONAL_ASSESSMENT: NONE - DENIES PAIN

## 2022-12-13 ASSESSMENT — PAIN SCALES - GENERAL: PAINLEVEL_OUTOF10: 0

## 2022-12-13 NOTE — ED PROVIDER NOTES
[address] [place] [address] EMERGENCY DEPARTMENT    CHIEF COMPLAINT  Shortness of Breath (Pt states that he pcp sent her here to \"get checked for wheezing\" pt is alert and orient, 98% on room air after ambulating, pt drove self to ED )       HISTORY OF PRESENT ILLNESS  Geoff Waters is a 80 y.o. female who presents to the ED with complaint of shortness of breath and wheezing. Her shortness of breath has been present for approximately 1 week. She tested negative for flu and COVID when she was seen by her PCP on 12 5. She had a cough at that time which has essentially resolved. She complains of persistent shortness of breath with exertion. Denies fever, chest pain. Complains of slight wheezing. Denies nausea, vomiting, diarrhea, abdominal pain, leg swelling. Denies tobacco.  She had an echo in November 2022 that demonstrated no evidence of CHF. Chart review reveals patient was ambulated in the clinic and her SPO2 was noted to decrease to 90% and patient became tachycardic with a heart rate of 140. She was therefore referred to the ED for further evaluation. No other complaints, modifying factors or associated symptoms. I have reviewed the following from the nursing documentation:    Past Medical History:   Diagnosis Date    Cataract 2/24/2016    Diverticulosis     Essential hypertension 5/16/2014    HTN (hypertension)     Mixed hyperlipidemia 5/16/2014     Past Surgical History:   Procedure Laterality Date    CATARACT REMOVAL Bilateral     NASAL POLYP SURGERY       Family History   Problem Relation Age of Onset    Cancer Mother         breast cancer    Heart Attack Father 52     Social History     Socioeconomic History    Marital status:       Spouse name: Not on file    Number of children: Not on file    Years of education: Not on file    Highest education level: Not on file   Occupational History    Not on file   Tobacco Use    Smoking status: Never    Smokeless tobacco: Never   Substance and Sexual Activity    Alcohol use: Yes     Alcohol/week: 14.0 standard drinks     Types: 7 Standard drinks or equivalent, 7 Glasses of wine per week    Drug use: No    Sexual activity: Not on file   Other Topics Concern    Not on file   Social History Narrative    Not on file     Social Determinants of Health     Financial Resource Strain: Low Risk     Difficulty of Paying Living Expenses: Not hard at all   Food Insecurity: No Food Insecurity    Worried About Running Out of Food in the Last Year: Never true    Ran Out of Food in the Last Year: Never true   Transportation Needs: No Transportation Needs    Lack of Transportation (Medical): No    Lack of Transportation (Non-Medical): No   Physical Activity: Not on file   Stress: Not on file   Social Connections: Not on file   Intimate Partner Violence: Not on file   Housing Stability: Not on file     No current facility-administered medications for this encounter. Current Outpatient Medications   Medication Sig Dispense Refill    albuterol sulfate HFA (VENTOLIN HFA) 108 (90 Base) MCG/ACT inhaler Inhale 2 puffs into the lungs 4 times daily as needed for Wheezing 18 g 0    Spacer/Aero-Holding Chambers NANCY 1 Device by Does not apply route daily as needed (wheezing) 1 each 0    pantoprazole (PROTONIX) 20 MG tablet Take 1 tablet by mouth every morning (before breakfast) 90 tablet 0    VITAMIN D PO Take by mouth      ZINC PO Take by mouth       No Known Allergies    REVIEW OF SYSTEMS  10 systems reviewed, pertinent positives and negatives per HPI, otherwise noted to be negative. PHYSICAL EXAM  ED Triage Vitals [12/13/22 1600]   BP Temp Temp Source Heart Rate Resp SpO2 Height Weight   (!) 138/54 98.9 °F (37.2 °C) Oral 68 20 98 % -- 130 lb 1.1 oz (59 kg)     General appearance: Awake and alert. Cooperative. No acute distress. HENT: Normocephalic. Atraumatic. Mucous membranes are moist.  Neck: Supple. Eyes: PERRL. EOMI. Heart/Chest: RRR. No murmurs.     Lungs: Respirations unlabored. Scant expiratory wheeze. Good air exchange. Speaking comfortably in full sentences. Abdomen: Soft. Non-tender. Non-distended. No rebound or guarding. Musculoskeletal: No extremity edema. No deformity. No tenderness in the extremities. All extremities neurovascularly intact. Skin: Warm and dry. No acute rashes. Neurological: Alert and oriented. CN II-XII intact. Strength 5/5 bilateral upper and lower extremities. Sensation intact to light touch. Gait normal.  Psychiatric: Mood/affect: normal      LABS  I have reviewed all labs for this visit.    Results for orders placed or performed during the hospital encounter of 12/13/22   CBC with Auto Differential   Result Value Ref Range    WBC 11.0 4.0 - 11.0 K/uL    RBC 4.35 4.00 - 5.20 M/uL    Hemoglobin 12.3 12.0 - 16.0 g/dL    Hematocrit 38.5 36.0 - 48.0 %    MCV 88.5 80.0 - 100.0 fL    MCH 28.4 26.0 - 34.0 pg    MCHC 32.1 31.0 - 36.0 g/dL    RDW 14.0 12.4 - 15.4 %    Platelets 338 (H) 927 - 450 K/uL    MPV 7.0 5.0 - 10.5 fL    Neutrophils % 77.2 %    Lymphocytes % 14.0 %    Monocytes % 7.5 %    Eosinophils % 0.9 %    Basophils % 0.4 %    Neutrophils Absolute 8.5 (H) 1.7 - 7.7 K/uL    Lymphocytes Absolute 1.5 1.0 - 5.1 K/uL    Monocytes Absolute 0.8 0.0 - 1.3 K/uL    Eosinophils Absolute 0.1 0.0 - 0.6 K/uL    Basophils Absolute 0.0 0.0 - 0.2 K/uL   Comprehensive Metabolic Panel   Result Value Ref Range    Sodium 139 136 - 145 mmol/L    Potassium 3.5 3.5 - 5.1 mmol/L    Chloride 100 99 - 110 mmol/L    CO2 29 21 - 32 mmol/L    Anion Gap 10 3 - 16    Glucose 100 (H) 70 - 99 mg/dL    BUN 11 7 - 20 mg/dL    Creatinine 0.5 (L) 0.6 - 1.2 mg/dL    Est, Glom Filt Rate >60 >60    Calcium 9.1 8.3 - 10.6 mg/dL    Total Protein 7.6 6.4 - 8.2 g/dL    Albumin 3.1 (L) 3.4 - 5.0 g/dL    Albumin/Globulin Ratio 0.7 (L) 1.1 - 2.2    Total Bilirubin <0.2 0.0 - 1.0 mg/dL    Alkaline Phosphatase 84 40 - 129 U/L    ALT 8 (L) 10 - 40 U/L    AST 19 15 - 37 U/L Brain Natriuretic Peptide   Result Value Ref Range    Pro- 0 - 449 pg/mL       RADIOLOGY  I have reviewed all radiographic studies for this visit. XR CHEST (2 VW)   Final Result   No acute cardiopulmonary disease. ED COURSE/MDM  Patient seen and evaluated. Old records reviewed. Labs and imaging reviewed and results discussed with patient/family to extent possible. This is an 20-year-old female presenting with complaint of dyspnea on exertion and wheezing. On arrival the patient is afebrile and nontoxic in appearance. Maintaining adequate oxygenation on room air with no increased work of breathing. She is scant wheezing noted on exam and was administered a nebulizer treatment. Chest x-ray shows nothing acute. CBC no leukocytosis or anemia. Renal panel no significant electrolyte abnormality or creatinine elevation. BNP within normal limits. Patient was ambulated with pulse oximetry after breathing treatment and was able to perform without difficulty. There is resolution of wheezing on her lung exam.  She will be discharged with a prescription for an albuterol inhaler and spacing chamber. Close follow-up with PCP in several days. Usual strict return precautions. Is this patient to be included in the SEP-1 Core Measure? No   Exclusion criteria - the patient is NOT to be included for SEP-1 Core Measure due to: Infection is not suspected    I, Easter Cogan, MD, am the primary clinician of record. During the patient's ED course, the patient was given:  Medications   ipratropium-albuterol (DUONEB) nebulizer solution 1 ampule (1 ampule Inhalation Given 12/13/22 9725)        All questions were answered and the patient/family expressed understanding and agreement with the plan. PROCEDURES  None    CRITICAL CARE  N/A    CLINICAL IMPRESSION  1. Shortness of breath    2.  Wheezing        DISPOSITION   discharge     Easter Cogan, MD    Note: This chart was created using voice recognition dictation software. Efforts were made by me to ensure accuracy, however some errors may be present due to limitations of this technology and occasionally words are not transcribed correctly.        Easter Cogan, MD  12/13/22 2032

## 2022-12-13 NOTE — PROGRESS NOTES
Date: 12/13/2022                                               Subjective/Objective:     Chief Complaint   Patient presents with    Shortness of Breath     Started about 1 week ago. Has had a cough as well. KELLI Obrien is a 79 yo female, visit today for shortness of breath. She was seen by Dr Abran Lopez 12/5/2022 for fatigue and cough. COVID and flu testing negative. She reports that the cough has nearly resolved. She is here today for UMANZOR that began one week ago, about the same time as her URI symptoms. UMANZOR is not worsening nor improving. She has noticed some wheezing however isn't sure if this is new. No SOB at rest. No associated chest pain, dizziness. She feels well overall, aside from [address]. She denies history of asthma/COPD, is a never smoker. She had ECHO 11/2022:      Conclusions      Summary   Normal left ventricle size, wall thickness, and systolic function with an   estimated ejection fraction of 60-65%. No regional wall motion abnormalities   are seen. The right ventricle is normal in size and function. Trace mitral and tricuspid regurgitation.             Patient Active Problem List    Diagnosis Date Noted    Viral upper respiratory tract infection 12/05/2022    Cataract 02/24/2016    Essential hypertension 05/16/2014    Mixed hyperlipidemia 05/16/2014    Nasal polyps 10/03/2013    Diverticulosis     Other malaise and fatigue 09/01/2009    Contact dermatitis and other eczema, due to unspecified cause 07/08/2009       Past Medical History:   Diagnosis Date    Cataract 2/24/2016    Diverticulosis     Essential hypertension 5/16/2014    HTN (hypertension)     Mixed hyperlipidemia 5/16/2014       Past Surgical History:   Procedure Laterality Date    CATARACT REMOVAL Bilateral     NASAL POLYP SURGERY         Office Visit on 12/05/2022   Component Date Value Ref Range Status    Rapid Influenza A Ag 12/05/2022 Negative  Negative Final    Rapid Influenza B Ag 12/05/2022 Negative  Negative Final    SARS-CoV-2, PCR 12/05/2022 Not Detected  Not Detected Final    Comment: This test has been authorized by FDA under an  Emergency Use Authorization (EUA). This test is only authorized for the duration of the  time of declaration that circumstances exist justifying the  authorization of the emergency use of in vitro diagnostic  testing for detection of the SARS-CoV-2 virus  and/or diagnosis of COVID-19 infection under  section 564 (b)(1) of the Act, 21 U. S.C. 273QAK-3 (b) (1),  unless the authorization is terminated or revoked sooner. Patient Fact [email or web address]  Provider Fact [email or web address]    METHODOLOGY: RT PCR         Family History   Problem Relation Age of Onset    Cancer Mother         breast cancer    Heart Attack Father 52       Current Outpatient Medications   Medication Sig Dispense Refill    pantoprazole (PROTONIX) 20 MG tablet Take 1 tablet by mouth every morning (before breakfast) 90 tablet 0    VITAMIN D PO Take by mouth      ZINC PO Take by mouth       No current facility-administered medications for this visit. No Known Allergies    Review of Systems   Constitutional:  Negative for appetite change, chills, fever and unexpected weight change. Respiratory:  Positive for cough, shortness of breath and wheezing. Cardiovascular:  Negative for chest pain. Gastrointestinal:  Negative for diarrhea, nausea and vomiting. Neurological:  Negative for dizziness. Vitals:  /84 (Site: Left Upper Arm, Position: Sitting, Cuff Size: Small Adult)   Pulse (!) 140   Temp 98.8 °F (37.1 °C) (Oral)   Wt 129 lb (58.5 kg)   SpO2 90%   BMI 23.59 kg/m²     Physical Exam  Vitals reviewed. Constitutional:       General: She is not in acute distress. HENT:      Head: Normocephalic and atraumatic. Cardiovascular:      Rate and Rhythm: Normal rate and regular rhythm. Heart sounds: Normal heart sounds.    Pulmonary: Effort: Pulmonary effort is normal.      Breath sounds: Wheezing present. Comments: Expiratory wheezing bilateral bases  Crackles bilateral bases  Neurological:      General: No focal deficit present. Mental Status: She is alert and oriented to person, place, and time. Psychiatric:         Mood and Affect: Mood normal.         Behavior: Behavior normal.         Thought Content: Thought content normal.         Judgment: Judgment normal.       Assessment/Plan     1. UMANZOR (dyspnea on exertion): with recent suspected viral URI. Does have wheezing / crackles on exam, pneumonia is in the differential. Pt ambulated in clinic with SPO2 decreasing to 90% and tachycardic with . She has been sent to ER to r/o PE given desaturation and tachycardia with exertion. She declines EMS transport. Report called to ER. No orders of the defined types were placed in this encounter. Return if symptoms worsen or fail to improve. OR sooner with questions, concerns, worsening symptoms    MAY SUTTON  12/13/2022  4:06 PM    Discussed use, benefit, and side effects of prescribed medications. Barriers to medication compliance addressed. Discussed all ordered testing and labs. All patient questions answered. Patient agreeable with plan above. Please note that this chart was generated using dragon dictation software. Although every effort was made to ensure the accuracy of this automated transcription, some errors in transcription may have occurred.

## 2022-12-14 LAB
EKG ATRIAL RATE: 93 BPM
EKG DIAGNOSIS: NORMAL
EKG P AXIS: 46 DEGREES
EKG P-R INTERVAL: 200 MS
EKG Q-T INTERVAL: 342 MS
EKG QRS DURATION: 72 MS
EKG QTC CALCULATION (BAZETT): 425 MS
EKG R AXIS: -21 DEGREES
EKG T AXIS: 12 DEGREES
EKG VENTRICULAR RATE: 93 BPM

## 2022-12-14 PROCEDURE — 93010 ELECTROCARDIOGRAM REPORT: CPT | Performed by: INTERNAL MEDICINE

## 2023-05-19 ENCOUNTER — OFFICE VISIT (OUTPATIENT)
Dept: INTERNAL MEDICINE CLINIC | Age: 88
End: 2023-05-19

## 2023-05-19 VITALS
WEIGHT: 125.13 LBS | OXYGEN SATURATION: 100 % | SYSTOLIC BLOOD PRESSURE: 138 MMHG | DIASTOLIC BLOOD PRESSURE: 70 MMHG | BODY MASS INDEX: 22.89 KG/M2 | TEMPERATURE: 98.7 F | HEART RATE: 71 BPM

## 2023-05-19 DIAGNOSIS — R41.3 MEMORY LOSS: ICD-10-CM

## 2023-05-19 DIAGNOSIS — R63.4 WEIGHT LOSS: ICD-10-CM

## 2023-05-19 DIAGNOSIS — Z00.00 MEDICARE ANNUAL WELLNESS VISIT, SUBSEQUENT: Primary | ICD-10-CM

## 2023-05-19 SDOH — ECONOMIC STABILITY: HOUSING INSECURITY
IN THE LAST 12 MONTHS, WAS THERE A TIME WHEN YOU DID NOT HAVE A STEADY PLACE TO SLEEP OR SLEPT IN A SHELTER (INCLUDING NOW)?: NO

## 2023-05-19 SDOH — ECONOMIC STABILITY: FOOD INSECURITY: WITHIN THE PAST 12 MONTHS, THE FOOD YOU BOUGHT JUST DIDN'T LAST AND YOU DIDN'T HAVE MONEY TO GET MORE.: NEVER TRUE

## 2023-05-19 SDOH — ECONOMIC STABILITY: INCOME INSECURITY: HOW HARD IS IT FOR YOU TO PAY FOR THE VERY BASICS LIKE FOOD, HOUSING, MEDICAL CARE, AND HEATING?: NOT VERY HARD

## 2023-05-19 SDOH — ECONOMIC STABILITY: FOOD INSECURITY: WITHIN THE PAST 12 MONTHS, YOU WORRIED THAT YOUR FOOD WOULD RUN OUT BEFORE YOU GOT MONEY TO BUY MORE.: NEVER TRUE

## 2023-05-19 ASSESSMENT — PATIENT HEALTH QUESTIONNAIRE - PHQ9
2. FEELING DOWN, DEPRESSED OR HOPELESS: 0
SUM OF ALL RESPONSES TO PHQ9 QUESTIONS 1 & 2: 0
SUM OF ALL RESPONSES TO PHQ QUESTIONS 1-9: 0
1. LITTLE INTEREST OR PLEASURE IN DOING THINGS: 0

## 2023-05-19 ASSESSMENT — LIFESTYLE VARIABLES
HOW MANY STANDARD DRINKS CONTAINING ALCOHOL DO YOU HAVE ON A TYPICAL DAY: 1 OR 2
HOW OFTEN DO YOU HAVE A DRINK CONTAINING ALCOHOL: MONTHLY OR LESS

## 2023-05-19 NOTE — PATIENT INSTRUCTIONS
Nutrition drink daily and follow up in 4 weeks     Learning About Being Active as an Older Adult  Why is being active important as you get older? Being active is one of the best things you can do for your health. And it's never too late to start. Being active--or getting active, if you aren't already--has definite benefits. It can:  Give you more energy,  Keep your mind sharp. Improve balance to reduce your risk of falls. Help you manage chronic illness with fewer medicines. No matter how old you are, how fit you are, or what health problems you have, there is a form of activity that will work for you. And the more physical activity you can do, the better your overall health will be. What kinds of activity can help you stay healthy? Being more active will make your daily activities easier. Physical activity includes planned exercise and things you do in daily life. There are four types of activity:  Aerobic. Doing aerobic activity makes your heart and lungs strong. Includes walking, dancing, and gardening. Aim for at least 2½ hours spread throughout the week. It improves your energy and can help you sleep better. Muscle-strengthening. This type of activity can help maintain muscle and strengthen bones. Includes climbing stairs, using resistance bands, and lifting or carrying heavy loads. Aim for at least twice a week. It can help protect the knees and other joints. Stretching. Stretching gives you better range of motion in joints and muscles. Includes upper arm stretches, calf stretches, and gentle yoga. Aim for at least twice a week, preferably after your muscles are warmed up from other activities. It can help you function better in daily life. Balancing. This helps you stay coordinated and have good posture. Includes heel-to-toe walking, shabana chi, and certain types of yoga. Aim for at least 3 days a week. It can reduce your risk of falling.   Even if you have a hard time meeting the

## 2023-05-19 NOTE — PROGRESS NOTES
Medicare Annual Wellness Visit    Madhavi Orozco is here for Medicare AWV    Assessment & Plan   Medicare annual wellness visit, subsequent: PDMP reviewed. Declines shingrix and pneumonia vaccination. Weight loss: 5 lb weight loss since 12/2022. Appetite is at baseline, although she isn't eating as much as she was previously. She declines to have labs today. She was encouraged to drink nutrition drink daily - ensure samples provided. Follow up 4 weeks. Memory loss: gradual, chronic. She declines labs, MRI and neurology referral      Recommendations for Preventive Services Due: see orders and patient instructions/AVS.  Recommended screening schedule for the next 5-10 years is provided to the patient in written form: see Patient Instructions/AVS.     Return in about 4 weeks (around 6/16/2023) for weight loss. Subjective     Has had gradual memory loss - over last several years. Lives alone. Is not having trouble with bill paying or grocery shopping. Has not had issues driving. Short term memory loss has been troublesome. Patient's complete Health Risk Assessment and screening values have been reviewed and are found in Flowsheets. The following problems were reviewed today and where indicated follow up appointments were made and/or referrals ordered. Positive Risk Factor Screenings with Interventions:                 Weight and Activity:  Physical Activity: Inactive    Days of Exercise per Week: 0 days    Minutes of Exercise per Session: 0 min     On average, how many days per week do you engage in moderate to strenuous exercise (like a brisk walk)?: 0 days  Have you lost any weight without trying in the past 3 months?: No  Body mass index is 22.89 kg/m².       Inactivity Interventions:  Has lost 5 lb since last OV - does not taste well (chronic problem) so doesn't have a great appetite, has been eating less than normal.         Vision Screen:  Do you have difficulty driving, watching TV, or doing any

## 2023-06-27 ENCOUNTER — OFFICE VISIT (OUTPATIENT)
Dept: INTERNAL MEDICINE CLINIC | Age: 88
End: 2023-06-27

## 2023-06-27 VITALS
HEART RATE: 75 BPM | DIASTOLIC BLOOD PRESSURE: 76 MMHG | WEIGHT: 124.25 LBS | OXYGEN SATURATION: 99 % | TEMPERATURE: 97.2 F | SYSTOLIC BLOOD PRESSURE: 138 MMHG | BODY MASS INDEX: 22.73 KG/M2

## 2023-06-27 DIAGNOSIS — R63.4 WEIGHT LOSS: Primary | ICD-10-CM

## 2023-06-27 DIAGNOSIS — G89.29 CHRONIC RIGHT SHOULDER PAIN: ICD-10-CM

## 2023-06-27 DIAGNOSIS — M25.511 CHRONIC RIGHT SHOULDER PAIN: ICD-10-CM

## 2023-06-27 ASSESSMENT — ENCOUNTER SYMPTOMS
NAUSEA: 0
VOMITING: 0
COUGH: 0
DIARRHEA: 0
CONSTIPATION: 0

## 2023-07-26 ENCOUNTER — OFFICE VISIT (OUTPATIENT)
Dept: ORTHOPEDIC SURGERY | Age: 88
End: 2023-07-26

## 2023-07-26 VITALS — WEIGHT: 124 LBS | HEIGHT: 62 IN | BODY MASS INDEX: 22.82 KG/M2

## 2023-07-26 DIAGNOSIS — M25.511 RIGHT SHOULDER PAIN, UNSPECIFIED CHRONICITY: Primary | ICD-10-CM

## 2023-08-02 PROBLEM — M25.511 RIGHT SHOULDER PAIN: Status: ACTIVE | Noted: 2023-08-02

## 2023-08-02 NOTE — PROGRESS NOTES
CHIEF COMPLAINT: Right shoulder pain/ cuff tendinopathy/ impingement syndrome. HISTORY:  Ms. Sena Stewart 80 y.o. female right handed [facility] which started summer 2022. She is complaining of minimal to no achy pain, but muscle weakness. Pain is increase with elevation and decrease with rest. No radiation and no numbness and tingling sensation. No other complaint. No h/o trauma or gout. Past Medical History:   Diagnosis Date    Cataract 2/24/2016    Diverticulosis     Essential hypertension 5/16/2014    HTN (hypertension)     Mixed hyperlipidemia 5/16/2014       Past Surgical History:   Procedure Laterality Date    CATARACT REMOVAL Bilateral     NASAL POLYP SURGERY         Social History     Socioeconomic History    Marital status:      Spouse name: Not on file    Number of children: Not on file    Years of education: Not on file    Highest education level: Not on file   Occupational History    Not on file   Tobacco Use    Smoking status: Never    Smokeless tobacco: Never   Substance and Sexual Activity    Alcohol use: Yes     Alcohol/week: 14.0 standard drinks     Types: 7 Standard drinks or equivalent, 7 Glasses of wine per week    Drug use: No    Sexual activity: Not on file   Other Topics Concern    Not on file   Social History Narrative    Not on file     Social Determinants of Health     Financial Resource Strain: Low Risk     Difficulty of Paying Living Expenses: Not very hard   Food Insecurity: No Food Insecurity    Worried About Running Out of Food in the Last Year: Never true    Ran Out of Food in the Last Year: Never true   Transportation Needs: No Transportation Needs    Lack of Transportation (Medical): No    Lack of Transportation (Non-Medical):  No   Physical Activity: Inactive    Days of Exercise per Week: 0 days    Minutes of Exercise per Session: 0 min   Stress: Not on file   Social

## 2024-02-06 ENCOUNTER — OFFICE VISIT (OUTPATIENT)
Dept: INTERNAL MEDICINE CLINIC | Age: 89
End: 2024-02-06
Payer: MEDICARE

## 2024-02-06 VITALS
SYSTOLIC BLOOD PRESSURE: 130 MMHG | OXYGEN SATURATION: 96 % | WEIGHT: 126.13 LBS | HEART RATE: 82 BPM | DIASTOLIC BLOOD PRESSURE: 76 MMHG | BODY MASS INDEX: 23.07 KG/M2 | TEMPERATURE: 98.4 F

## 2024-02-06 DIAGNOSIS — E78.2 MIXED HYPERLIPIDEMIA: Chronic | ICD-10-CM

## 2024-02-06 DIAGNOSIS — I10 ESSENTIAL HYPERTENSION: Primary | Chronic | ICD-10-CM

## 2024-02-06 DIAGNOSIS — Z23 NEED FOR INFLUENZA VACCINATION: ICD-10-CM

## 2024-02-06 DIAGNOSIS — R63.4 WEIGHT LOSS: ICD-10-CM

## 2024-02-06 LAB
ANION GAP SERPL CALCULATED.3IONS-SCNC: 11 MMOL/L (ref 3–16)
BUN SERPL-MCNC: 14 MG/DL (ref 7–20)
CALCIUM SERPL-MCNC: 8.6 MG/DL (ref 8.3–10.6)
CHLORIDE SERPL-SCNC: 100 MMOL/L (ref 99–110)
CHOLEST SERPL-MCNC: 156 MG/DL (ref 0–199)
CO2 SERPL-SCNC: 30 MMOL/L (ref 21–32)
CREAT SERPL-MCNC: 0.7 MG/DL (ref 0.6–1.2)
GFR SERPLBLD CREATININE-BSD FMLA CKD-EPI: >60 ML/MIN/{1.73_M2}
GLUCOSE SERPL-MCNC: 129 MG/DL (ref 70–99)
HDLC SERPL-MCNC: 56 MG/DL (ref 40–60)
LDLC SERPL CALC-MCNC: 75 MG/DL
POTASSIUM SERPL-SCNC: 4.6 MMOL/L (ref 3.5–5.1)
SODIUM SERPL-SCNC: 141 MMOL/L (ref 136–145)
TRIGL SERPL-MCNC: 126 MG/DL (ref 0–150)
VLDLC SERPL CALC-MCNC: 25 MG/DL

## 2024-02-06 PROCEDURE — 1123F ACP DISCUSS/DSCN MKR DOCD: CPT | Performed by: NURSE PRACTITIONER

## 2024-02-06 PROCEDURE — 1036F TOBACCO NON-USER: CPT | Performed by: NURSE PRACTITIONER

## 2024-02-06 PROCEDURE — G8420 CALC BMI NORM PARAMETERS: HCPCS | Performed by: NURSE PRACTITIONER

## 2024-02-06 PROCEDURE — 1090F PRES/ABSN URINE INCON ASSESS: CPT | Performed by: NURSE PRACTITIONER

## 2024-02-06 PROCEDURE — G8484 FLU IMMUNIZE NO ADMIN: HCPCS | Performed by: NURSE PRACTITIONER

## 2024-02-06 PROCEDURE — G0008 ADMIN INFLUENZA VIRUS VAC: HCPCS | Performed by: NURSE PRACTITIONER

## 2024-02-06 PROCEDURE — 90694 VACC AIIV4 NO PRSRV 0.5ML IM: CPT | Performed by: NURSE PRACTITIONER

## 2024-02-06 PROCEDURE — 99213 OFFICE O/P EST LOW 20 MIN: CPT | Performed by: NURSE PRACTITIONER

## 2024-02-06 PROCEDURE — 36415 COLL VENOUS BLD VENIPUNCTURE: CPT | Performed by: NURSE PRACTITIONER

## 2024-02-06 PROCEDURE — G8427 DOCREV CUR MEDS BY ELIG CLIN: HCPCS | Performed by: NURSE PRACTITIONER

## 2024-02-06 ASSESSMENT — PATIENT HEALTH QUESTIONNAIRE - PHQ9
SUM OF ALL RESPONSES TO PHQ QUESTIONS 1-9: 0
SUM OF ALL RESPONSES TO PHQ9 QUESTIONS 1 & 2: 0
1. LITTLE INTEREST OR PLEASURE IN DOING THINGS: 0
SUM OF ALL RESPONSES TO PHQ QUESTIONS 1-9: 0

## 2024-02-06 ASSESSMENT — ENCOUNTER SYMPTOMS
CONSTIPATION: 0
SHORTNESS OF BREATH: 0

## 2024-02-06 NOTE — PROGRESS NOTES
Ventricular Rate 12/13/2022 93  BPM Final    Atrial Rate 12/13/2022 93  BPM Final    P-R Interval 12/13/2022 200  ms Final    QRS Duration 12/13/2022 72  ms Final    Q-T Interval 12/13/2022 342  ms Final    QTc Calculation (Bazett) 12/13/2022 425  ms Final    P Axis 12/13/2022 46  degrees Final    R Axis 12/13/2022 -21  degrees Final    T Axis 12/13/2022 12  degrees Final    Diagnosis 12/13/2022 Normal sinus rhythmNormal ECGNo previous ECGs availableConfirmed by JOY FORMAN MD ([id number]) on 12/14/2022 10:37:10 AM   Final    Pro-BNP 12/13/2022 160  0 - 449 pg/mL Final    Comment: Methodology by NT-proBNP    An age-independent cutoff point of 300 pg/ml has a 98%  negative predictive value excluding acute heart failure.  Values exceeding the age-related cutoff values (450 pg/mL if  age<50, 900 if 50-75 and 1800 if >75) has 90% sensitivity and  84% specificity for diagnosing acute HF. In patients with  renal compromise (eGFR<60) values greater than 1200pg/ml have  a diagnostic sensitivity and specificity of 89% and 72% for  acute HF.         Family History   Problem Relation Age of Onset    Cancer Mother         breast cancer    Heart Attack Father 49       Current Outpatient Medications   Medication Sig Dispense Refill    VITAMIN D PO Take by mouth      ZINC PO Take by mouth       No current facility-administered medications for this visit.       No Known Allergies    Review of Systems   Constitutional:  Positive for appetite change. Negative for chills, fever and unexpected weight change.   Respiratory:  Negative for shortness of breath.    Cardiovascular:  Negative for chest pain.   Gastrointestinal:  Negative for constipation.   Neurological:  Negative for dizziness.       Vitals:  /76 (Site: Left Upper Arm, Position: Sitting, Cuff Size: Medium Adult)   Pulse 82   Temp 98.4 °F (36.9 °C) (Oral)   Wt 57.2 kg (126 lb 2 oz)   SpO2 96%   BMI 23.07 kg/m²     Physical Exam  Constitutional:       General: She

## 2024-02-27 ENCOUNTER — TELEPHONE (OUTPATIENT)
Dept: INTERNAL MEDICINE CLINIC | Age: 89
End: 2024-02-27

## 2024-02-27 RX ORDER — AMOXICILLIN AND CLAVULANATE POTASSIUM 875; 125 MG/1; MG/1
1 TABLET, FILM COATED ORAL 2 TIMES DAILY
Qty: 14 TABLET | Refills: 0 | Status: SHIPPED | OUTPATIENT
Start: 2024-02-27 | End: 2024-03-05

## 2024-02-27 RX ORDER — FLUTICASONE PROPIONATE 50 MCG
1 SPRAY, SUSPENSION (ML) NASAL DAILY
Qty: 16 G | Refills: 0 | Status: SHIPPED | OUTPATIENT
Start: 2024-02-27

## 2024-02-27 NOTE — TELEPHONE ENCOUNTER
Pt called, she has had a cough for a few weeks, has been taking mucinex. Not getting worse, but not getting better. Phlegm in throat.    Please advise    Thank you

## 2024-05-22 NOTE — PROGRESS NOTES
Date: 5/23/2024                                               Subjective/Objective:     Chief Complaint   Patient presents with    Follow-up     Supplement for food     Shortness of Breath     When going up and down stairs       HPI    Juanita Pérez is a 88 yo female, visit today for weight loss, SOB.    She c/o UMANZOR that has been going on \"for a while.\" Notices mostly when walking up and down steps. None at rest. Resolves with rest. Denies chest pain, dizziness. Denies associated cough. Denies leg swelling.     Hypertension - not on medication. Denies chest pain and HA.     Hyperlipidemia - not on medication.      Weight 130 lb (12/2022) ---> 126 lb 2/2024 --> 122 lb today, reports her appetite is normal for her - she can't taste and smell so she just doesn't enjoy eating. This has been a chronic problem. Drinks ensures-drinking 3 a week. Goes to the grocery store and just nothing looks good.          Patient Active Problem List    Diagnosis Date Noted    Viral upper respiratory tract infection 12/05/2022    Right shoulder pain 08/02/2023    Cataract 02/24/2016    Essential hypertension 05/16/2014    Mixed hyperlipidemia 05/16/2014    Nasal polyps 10/03/2013    Diverticulosis     Other malaise and fatigue 09/01/2009    Contact dermatitis and other eczema, due to unspecified cause 07/08/2009       Past Medical History:   Diagnosis Date    Cataract 2/24/2016    Diverticulosis     Essential hypertension 5/16/2014    HTN (hypertension)     Mixed hyperlipidemia 5/16/2014       Past Surgical History:   Procedure Laterality Date    CATARACT REMOVAL Bilateral     NASAL POLYP SURGERY         Office Visit on 02/06/2024   Component Date Value Ref Range Status    Cholesterol, Total 02/06/2024 156  0 - 199 mg/dL Final    Triglycerides 02/06/2024 126  0 - 150 mg/dL Final    HDL 02/06/2024 56  40 - 60 mg/dL Final    Comment: An HDL cholesterol less than 40 mg/dL is low and  constitutes a coronary heart disease risk

## 2024-05-23 ENCOUNTER — OFFICE VISIT (OUTPATIENT)
Dept: INTERNAL MEDICINE CLINIC | Age: 89
End: 2024-05-23

## 2024-05-23 VITALS — SYSTOLIC BLOOD PRESSURE: 124 MMHG | DIASTOLIC BLOOD PRESSURE: 72 MMHG | WEIGHT: 122.2 LBS | BODY MASS INDEX: 22.35 KG/M2

## 2024-05-23 DIAGNOSIS — R06.09 DOE (DYSPNEA ON EXERTION): ICD-10-CM

## 2024-05-23 DIAGNOSIS — I10 ESSENTIAL HYPERTENSION: Primary | ICD-10-CM

## 2024-05-23 DIAGNOSIS — R63.4 WEIGHT LOSS: ICD-10-CM

## 2024-05-23 DIAGNOSIS — E78.2 MIXED HYPERLIPIDEMIA: ICD-10-CM

## 2024-05-23 LAB
BASOPHILS # BLD: 0.1 K/UL (ref 0–0.2)
BASOPHILS NFR BLD: 0.9 %
DEPRECATED RDW RBC AUTO: 14.1 % (ref 12.4–15.4)
EOSINOPHIL # BLD: 0.3 K/UL (ref 0–0.6)
EOSINOPHIL NFR BLD: 3.3 %
HCT VFR BLD AUTO: 39.4 % (ref 36–48)
HGB BLD-MCNC: 13 G/DL (ref 12–16)
LYMPHOCYTES # BLD: 1.7 K/UL (ref 1–5.1)
LYMPHOCYTES NFR BLD: 21.2 %
MCH RBC QN AUTO: 29.2 PG (ref 26–34)
MCHC RBC AUTO-ENTMCNC: 33.1 G/DL (ref 31–36)
MCV RBC AUTO: 88.4 FL (ref 80–100)
MONOCYTES # BLD: 0.9 K/UL (ref 0–1.3)
MONOCYTES NFR BLD: 11.5 %
NEUTROPHILS # BLD: 5 K/UL (ref 1.7–7.7)
NEUTROPHILS NFR BLD: 63.1 %
NT-PROBNP SERPL-MCNC: 142 PG/ML (ref 0–449)
PLATELET # BLD AUTO: 229 K/UL (ref 135–450)
PMV BLD AUTO: 8.6 FL (ref 5–10.5)
RBC # BLD AUTO: 4.45 M/UL (ref 4–5.2)
TSH SERPL DL<=0.005 MIU/L-ACNC: 3.3 UIU/ML (ref 0.27–4.2)
WBC # BLD AUTO: 7.9 K/UL (ref 4–11)

## 2024-05-23 SDOH — ECONOMIC STABILITY: INCOME INSECURITY: HOW HARD IS IT FOR YOU TO PAY FOR THE VERY BASICS LIKE FOOD, HOUSING, MEDICAL CARE, AND HEATING?: NOT HARD AT ALL

## 2024-05-23 SDOH — ECONOMIC STABILITY: FOOD INSECURITY: WITHIN THE PAST 12 MONTHS, YOU WORRIED THAT YOUR FOOD WOULD RUN OUT BEFORE YOU GOT MONEY TO BUY MORE.: NEVER TRUE

## 2024-05-23 SDOH — ECONOMIC STABILITY: FOOD INSECURITY: WITHIN THE PAST 12 MONTHS, THE FOOD YOU BOUGHT JUST DIDN'T LAST AND YOU DIDN'T HAVE MONEY TO GET MORE.: NEVER TRUE

## 2024-05-23 ASSESSMENT — ENCOUNTER SYMPTOMS
COUGH: 0
SHORTNESS OF BREATH: 1

## 2024-05-24 LAB
ALBUMIN SERPL-MCNC: 3.4 G/DL (ref 3.4–5)
ALBUMIN/GLOB SERPL: 0.9 {RATIO} (ref 1.1–2.2)
ALP SERPL-CCNC: 79 U/L (ref 40–129)
ALT SERPL-CCNC: 6 U/L (ref 10–40)
ANION GAP SERPL CALCULATED.3IONS-SCNC: 11 MMOL/L (ref 3–16)
AST SERPL-CCNC: 15 U/L (ref 15–37)
BILIRUB SERPL-MCNC: <0.2 MG/DL (ref 0–1)
BUN SERPL-MCNC: 18 MG/DL (ref 7–20)
CALCIUM SERPL-MCNC: 8.8 MG/DL (ref 8.3–10.6)
CHLORIDE SERPL-SCNC: 102 MMOL/L (ref 99–110)
CO2 SERPL-SCNC: 25 MMOL/L (ref 21–32)
CREAT SERPL-MCNC: 0.7 MG/DL (ref 0.6–1.2)
GFR SERPLBLD CREATININE-BSD FMLA CKD-EPI: 82 ML/MIN/{1.73_M2}
GLUCOSE SERPL-MCNC: 87 MG/DL (ref 70–99)
POTASSIUM SERPL-SCNC: 4 MMOL/L (ref 3.5–5.1)
PROT SERPL-MCNC: 7 G/DL (ref 6.4–8.2)
SODIUM SERPL-SCNC: 138 MMOL/L (ref 136–145)

## 2024-05-30 ENCOUNTER — HOSPITAL ENCOUNTER (OUTPATIENT)
Age: 89
Discharge: HOME | End: 2024-05-30
Payer: MEDICARE

## 2024-05-30 ENCOUNTER — HOSPITAL ENCOUNTER (OUTPATIENT)
Dept: GENERAL RADIOLOGY | Age: 89
Discharge: HOME | End: 2024-05-30
Payer: MEDICARE

## 2024-05-30 DIAGNOSIS — R63.4 WEIGHT LOSS: ICD-10-CM

## 2024-05-30 DIAGNOSIS — R06.09 DOE (DYSPNEA ON EXERTION): ICD-10-CM

## 2024-05-30 PROCEDURE — 71046 X-RAY EXAM CHEST 2 VIEWS: CPT

## 2024-05-31 DIAGNOSIS — R06.09 DOE (DYSPNEA ON EXERTION): Primary | ICD-10-CM

## 2024-05-31 RX ORDER — ALBUTEROL SULFATE 90 UG/1
2 AEROSOL, METERED RESPIRATORY (INHALATION) 4 TIMES DAILY PRN
Qty: 54 G | Refills: 1 | Status: SHIPPED | OUTPATIENT
Start: 2024-05-31

## 2024-06-06 ENCOUNTER — OFFICE VISIT (OUTPATIENT)
Dept: PULMONOLOGY | Age: 89
End: 2024-06-06
Payer: MEDICARE

## 2024-06-06 VITALS
TEMPERATURE: 97.5 F | OXYGEN SATURATION: 94 % | RESPIRATION RATE: 18 BRPM | HEIGHT: 62 IN | WEIGHT: 121.4 LBS | SYSTOLIC BLOOD PRESSURE: 125 MMHG | HEART RATE: 70 BPM | DIASTOLIC BLOOD PRESSURE: 79 MMHG | BODY MASS INDEX: 22.34 KG/M2

## 2024-06-06 DIAGNOSIS — R06.09 DYSPNEA ON EXERTION: Primary | ICD-10-CM

## 2024-06-06 DIAGNOSIS — J84.9 ILD (INTERSTITIAL LUNG DISEASE) (HCC): ICD-10-CM

## 2024-06-06 PROCEDURE — 99204 OFFICE O/P NEW MOD 45 MIN: CPT | Performed by: INTERNAL MEDICINE

## 2024-06-06 PROCEDURE — G8420 CALC BMI NORM PARAMETERS: HCPCS | Performed by: INTERNAL MEDICINE

## 2024-06-06 PROCEDURE — 1123F ACP DISCUSS/DSCN MKR DOCD: CPT | Performed by: INTERNAL MEDICINE

## 2024-06-06 PROCEDURE — G8427 DOCREV CUR MEDS BY ELIG CLIN: HCPCS | Performed by: INTERNAL MEDICINE

## 2024-06-06 PROCEDURE — 1090F PRES/ABSN URINE INCON ASSESS: CPT | Performed by: INTERNAL MEDICINE

## 2024-06-06 PROCEDURE — 1036F TOBACCO NON-USER: CPT | Performed by: INTERNAL MEDICINE

## 2024-06-06 ASSESSMENT — ENCOUNTER SYMPTOMS
HEMOPTYSIS: 0
WHEEZING: 0
SPUTUM PRODUCTION: 0
SHORTNESS OF BREATH: 1

## 2024-06-06 NOTE — PROGRESS NOTES
[facility] PULMONARY, SLEEP, AND CRITICAL CARE    Juanita Pérez (:  10/12/1934) is a 89 y.o. female,New patient, here for evaluation of the following chief complaint(s):  New Patient and Shortness of Breath (W/exertion)      Assessment & Plan   ASSESSMENT/PLAN:  1. Dyspnea on exertion  Assessment & Plan:  -Radiographic pattern suspicious for progressive interstitial lung disease.  Concerning for UIP/IPF.  -Needs high-resolution CT  -If she does have IPF would need to discuss treatment options with antifibrotic's as she may have limited benefit given her advanced age and unknown ability to tolerate GI side effects.  Orders:  -     CT CHEST HIGH RESOLUTION; Future  2. ILD (interstitial lung disease) (HCC)  -     CT CHEST HIGH RESOLUTION; Future      Return in about 4 weeks (around 2024).  Future Appointments   Date Time Provider Department Center   2024  9:40 AM Guillermo Barry MD  PULM [facility]            Subjective   SUBJECTIVE/OBJECTIVE:  89-year-old never smoker presents for evaluation of dyspnea on exertion    Shortness of Breath  This is a chronic problem. The current episode started more than 1 month ago. The problem occurs intermittently. The problem has been gradually worsening. Pertinent negatives include no fever, hemoptysis, sputum production or wheezing. The symptoms are aggravated by exercise. She has tried nothing for the symptoms.       Review of Systems   Constitutional:  Negative for fever.   Respiratory:  Positive for shortness of breath. Negative for hemoptysis, sputum production and wheezing.           Objective   Physical Exam     Gen:  No acute distress.   Eyes: EOMI. Anicteric sclera. No conjunctival injection.   ENT: No discharge.External appearance of ears and nose normal.  Neck: Trachea midline. No mass   Resp:  No crackles. No wheezes. No rhonchi. No dullness on percussion.  CV: Regular rate. Regular rhythm. No murmur or rub. No edema.   Neuro:  no focal neurologic

## 2024-06-06 NOTE — PATIENT INSTRUCTIONS
changes in your health, and be sure to contact your doctor if you have any problems.  Current as of: August 6, 2023  Content Version: 14.1  © 2006-2024 [employer].   Care instructions adapted under license by [employer]. If you have questions about a medical condition or this instruction, always ask your healthcare professional. [employer] disclaims any warranty or liability for your use of this information.

## 2024-06-06 NOTE — ASSESSMENT & PLAN NOTE
-Radiographic pattern suspicious for progressive interstitial lung disease.  Concerning for UIP/IPF.  -Needs high-resolution CT  -If she does have IPF would need to discuss treatment options with antifibrotic's as she may have limited benefit given her advanced age and unknown ability to tolerate GI side effects.

## 2024-06-19 ENCOUNTER — HOSPITAL ENCOUNTER (OUTPATIENT)
Dept: CT IMAGING | Age: 89
Discharge: HOME | End: 2024-06-19
Attending: INTERNAL MEDICINE
Payer: MEDICARE

## 2024-06-19 DIAGNOSIS — J84.9 ILD (INTERSTITIAL LUNG DISEASE) (HCC): ICD-10-CM

## 2024-06-19 DIAGNOSIS — R06.09 DYSPNEA ON EXERTION: ICD-10-CM

## 2024-06-19 PROCEDURE — 71250 CT THORAX DX C-: CPT

## 2024-07-17 ENCOUNTER — OFFICE VISIT (OUTPATIENT)
Dept: PULMONOLOGY | Age: 89
End: 2024-07-17
Payer: MEDICARE

## 2024-07-17 VITALS
HEIGHT: 62 IN | RESPIRATION RATE: 18 BRPM | TEMPERATURE: 96.8 F | OXYGEN SATURATION: 98 % | SYSTOLIC BLOOD PRESSURE: 110 MMHG | WEIGHT: 122 LBS | DIASTOLIC BLOOD PRESSURE: 68 MMHG | BODY MASS INDEX: 22.45 KG/M2 | HEART RATE: 80 BPM

## 2024-07-17 DIAGNOSIS — J84.112 UIP (USUAL INTERSTITIAL PNEUMONITIS) (HCC): Primary | ICD-10-CM

## 2024-07-17 DIAGNOSIS — J84.112 UIP (USUAL INTERSTITIAL PNEUMONITIS) (HCC): ICD-10-CM

## 2024-07-17 LAB
ERYTHROCYTE [SEDIMENTATION RATE] IN BLOOD BY WESTERGREN METHOD: 62 MM/HR (ref 0–30)
REASON FOR REJECTION: NORMAL
REJECTED TEST: NORMAL

## 2024-07-17 PROCEDURE — 1036F TOBACCO NON-USER: CPT | Performed by: INTERNAL MEDICINE

## 2024-07-17 PROCEDURE — 1090F PRES/ABSN URINE INCON ASSESS: CPT | Performed by: INTERNAL MEDICINE

## 2024-07-17 PROCEDURE — 99214 OFFICE O/P EST MOD 30 MIN: CPT | Performed by: INTERNAL MEDICINE

## 2024-07-17 PROCEDURE — G8427 DOCREV CUR MEDS BY ELIG CLIN: HCPCS | Performed by: INTERNAL MEDICINE

## 2024-07-17 PROCEDURE — 1123F ACP DISCUSS/DSCN MKR DOCD: CPT | Performed by: INTERNAL MEDICINE

## 2024-07-17 PROCEDURE — G8420 CALC BMI NORM PARAMETERS: HCPCS | Performed by: INTERNAL MEDICINE

## 2024-07-17 ASSESSMENT — ENCOUNTER SYMPTOMS: RESPIRATORY NEGATIVE: 1

## 2024-07-17 NOTE — ASSESSMENT & PLAN NOTE
-Concern for advancing IPF  -Check autoimmune serologies  -Unclear that she will be a good candidate for antifibrotic therapy but we will discuss pending the results of her autoimmune panel  -Currently she is relatively asymptomatic

## 2024-07-17 NOTE — PROGRESS NOTES
[facility] PULMONARY, SLEEP, AND CRITICAL CARE    Juanita Pérez (:  10/12/1934) is a 89 y.o. female,New patient, here for evaluation of the following chief complaint(s):  Follow-up and Shortness of Breath      Assessment & Plan   ASSESSMENT/PLAN:  1. UIP (usual interstitial pneumonitis) (HCC)  Assessment & Plan:  -Concern for advancing IPF  -Check autoimmune serologies  -Unclear that she will be a good candidate for antifibrotic therapy but we will discuss pending the results of her autoimmune panel  -Currently she is relatively asymptomatic      Return in about 4 weeks (around 2024).  No future appointments.           Subjective   SUBJECTIVE/OBJECTIVE:  89-year-old never smoker presents for follow-up ILD.  HRCT indicates probable UIP.  She has no other systemic symptoms.  Her breathing is stable.        Review of Systems   Constitutional: Negative.    Respiratory: Negative.     Cardiovascular: Negative.    Musculoskeletal: Negative.           Objective   Physical Exam     Gen:  No acute distress.   Eyes: EOMI. Anicteric sclera. No conjunctival injection.   ENT: No discharge.External appearance of ears and nose normal.  Neck: Trachea midline. No mass   Resp:  No crackles. No wheezes. No rhonchi. No dullness on percussion.  CV: Regular rate. Regular rhythm. No murmur or rub. No edema.   Neuro:  no focal neurologic deficit.  Moves all extremities  Psych: Awake and alert, Oriented x 3.  Judgement and insight appropriate.  Mood stable.    Chest CT images reviewed personally by me, interpretation as follows:  -Subpleural fibrosis with traction bronchiectasis.  Some early honeycombing concerning for developing IPF.  An electronic signature was used to authenticate this note.    --Guillermo Barry MD

## 2024-07-22 ENCOUNTER — TELEPHONE (OUTPATIENT)
Dept: PULMONOLOGY | Age: 89
End: 2024-07-22

## 2024-07-22 NOTE — TELEPHONE ENCOUNTER
Some of pTIENTS bw WAS NOT RAN TO TO SPECIMEN NOT SPUN  Fyi    THEY CALL THE OFFICE TO REPORT AND THEY CALL PATIENT TO HAVE HER COME BACK IN FOR A RE-DRAW  fyi

## 2024-07-24 NOTE — TELEPHONE ENCOUNTER
The lab called in again to say they've made multiple attempts to get a hold of Juanita but were unsuccessful.

## 2024-07-31 LAB — RHEUMATOID FACT SER IA-ACNC: <10 IU/ML

## 2024-08-01 LAB
ANA SER QL IA: NEGATIVE
CCP IGG SERPL-ACNC: <0.5 U/ML (ref 0–2.9)

## 2024-08-02 LAB
ALDOLASE SERPL-CCNC: 3.9 U/L (ref 1.2–7.6)
MYELOPEROXIDASE AB SER-ACNC: 0 AU/ML (ref 0–19)
PROTEINASE3 AB SER-ACNC: 2 AU/ML (ref 0–19)

## 2024-08-21 ENCOUNTER — OFFICE VISIT (OUTPATIENT)
Dept: PULMONOLOGY | Age: 89
End: 2024-08-21
Payer: MEDICARE

## 2024-08-21 VITALS
SYSTOLIC BLOOD PRESSURE: 110 MMHG | DIASTOLIC BLOOD PRESSURE: 70 MMHG | RESPIRATION RATE: 16 BRPM | WEIGHT: 120.8 LBS | BODY MASS INDEX: 22.23 KG/M2 | HEIGHT: 62 IN | TEMPERATURE: 97.4 F

## 2024-08-21 DIAGNOSIS — J84.112 UIP (USUAL INTERSTITIAL PNEUMONITIS) (HCC): Primary | ICD-10-CM

## 2024-08-21 PROCEDURE — 1123F ACP DISCUSS/DSCN MKR DOCD: CPT | Performed by: INTERNAL MEDICINE

## 2024-08-21 PROCEDURE — 1036F TOBACCO NON-USER: CPT | Performed by: INTERNAL MEDICINE

## 2024-08-21 PROCEDURE — G8427 DOCREV CUR MEDS BY ELIG CLIN: HCPCS | Performed by: INTERNAL MEDICINE

## 2024-08-21 PROCEDURE — 99213 OFFICE O/P EST LOW 20 MIN: CPT | Performed by: INTERNAL MEDICINE

## 2024-08-21 PROCEDURE — 1090F PRES/ABSN URINE INCON ASSESS: CPT | Performed by: INTERNAL MEDICINE

## 2024-08-21 PROCEDURE — G8420 CALC BMI NORM PARAMETERS: HCPCS | Performed by: INTERNAL MEDICINE

## 2024-08-21 ASSESSMENT — ENCOUNTER SYMPTOMS: RESPIRATORY NEGATIVE: 1

## 2024-08-21 NOTE — PROGRESS NOTES
[facility] PULMONARY, SLEEP, AND CRITICAL CARE    Juanita Pérez (:  10/12/1934) is a 89 y.o. female,New patient, here for evaluation of the following chief complaint(s):  Follow-up      Assessment & Plan   ASSESSMENT/PLAN:  1. UIP (usual interstitial pneumonitis) (HCC)  Assessment & Plan:  -CT consistent with IPF  -Negative serologic markers  -She is not interested in any type of therapy including antifibrotic's.  We reviewed pros and cons at length  -She would prefer to continue symptomatic monitoring which I think is reasonable given her advanced age.        Return in about 3 months (around 2024).  No future appointments.             Subjective   SUBJECTIVE/OBJECTIVE:  89-year-old never smoker presents for follow-up IPF.  Autoimmune serologies negative.  -Symptoms stable        Review of Systems   Constitutional: Negative.    Respiratory: Negative.     Cardiovascular: Negative.    Musculoskeletal: Negative.           Objective   Physical Exam     Gen:  No acute distress.   Eyes: EOMI. Anicteric sclera. No conjunctival injection.   ENT: No discharge.External appearance of ears and nose normal.  Neck: Trachea midline. No mass   Resp:  No crackles. No wheezes. No rhonchi. No dullness on percussion.  CV: Regular rate. Regular rhythm. No murmur or rub. No edema.   Neuro:  no focal neurologic deficit.  Moves all extremities  Psych: Awake and alert, Oriented x 3.  Judgement and insight appropriate.  Mood stable.    I spent 21 minutes on this case today, >50% was face-to-face in discussion of the diagnosis and the coordination of care in regards to the treatment plan.  All questions answered.   An electronic signature was used to authenticate this note.    --Guillermo Barry MD

## 2024-08-21 NOTE — PATIENT INSTRUCTIONS
Patient Education      IDIOPATHIC PULMONARY FIBROSIS (IPF)  Interstitial Lung Disease: Care Instructions  Your Care Instructions     Interstitial lung disease is a long-term (chronic) lung disease. It happens because of damage between the air sacs in the lung. The damage scars the lung and causes breathing problems.  People with interstitial lung disease get breathless during exercise and may have a dry cough. These problems may get worse slowly or very quickly.  Interstitial lung disease can be caused by breathing in dust from asbestos and silica. It also can be caused by infections and some medicines. Sometimes doctors cannot find the cause.  You may get medicine to treat the problem. Corticosteroids can sometimes reduce the swelling of lung tissue and prevent more damage. Oxygen treatment may help your condition.  Follow-up care is a key part of your treatment and safety. Be sure to make and go to all appointments, and call your doctor if you are having problems. It's also a good idea to know your test results and keep a list of the medicines you take.  How can you care for yourself at home?  Do not smoke. Smoking makes interstitial lung disease worse. If you need help quitting, talk to your doctor about stop-smoking programs and medicines. These can increase your chances of quitting for good.  Take your medicines exactly as prescribed. Call your doctor if you have any problems with your medicine.  Get flu and pneumococcal shots. Stay up to date on your COVID-19 vaccines. These vaccines help prevent lung infection.  Make an exercise plan with help from your doctor or other health professional. Exercise can help you breathe more easily.  Think about joining a support group. This can help you cope with problems caused by interstitial lung disease.  When should you call for help?   Call your doctor now or seek immediate medical care if:    Your shortness of breath gets worse.     You cough up blood.     You have

## 2024-08-21 NOTE — ASSESSMENT & PLAN NOTE
-CT consistent with IPF  -Negative serologic markers  -She is not interested in any type of therapy including antifibrotic's.  We reviewed pros and cons at length  -She would prefer to continue symptomatic monitoring which I think is reasonable given her advanced age.

## 2024-09-30 ENCOUNTER — OFFICE VISIT (OUTPATIENT)
Dept: INTERNAL MEDICINE CLINIC | Age: 89
End: 2024-09-30
Payer: MEDICARE

## 2024-09-30 VITALS
SYSTOLIC BLOOD PRESSURE: 139 MMHG | BODY MASS INDEX: 22.45 KG/M2 | HEART RATE: 79 BPM | HEIGHT: 62 IN | OXYGEN SATURATION: 95 % | DIASTOLIC BLOOD PRESSURE: 80 MMHG | WEIGHT: 122 LBS

## 2024-09-30 DIAGNOSIS — Z23 FLU VACCINE NEED: ICD-10-CM

## 2024-09-30 DIAGNOSIS — Z00.00 MEDICARE ANNUAL WELLNESS VISIT, SUBSEQUENT: Primary | ICD-10-CM

## 2024-09-30 PROCEDURE — 1123F ACP DISCUSS/DSCN MKR DOCD: CPT | Performed by: NURSE PRACTITIONER

## 2024-09-30 PROCEDURE — 90653 IIV ADJUVANT VACCINE IM: CPT | Performed by: NURSE PRACTITIONER

## 2024-09-30 PROCEDURE — G0008 ADMIN INFLUENZA VIRUS VAC: HCPCS | Performed by: NURSE PRACTITIONER

## 2024-09-30 PROCEDURE — G0439 PPPS, SUBSEQ VISIT: HCPCS | Performed by: NURSE PRACTITIONER

## 2024-09-30 ASSESSMENT — LIFESTYLE VARIABLES
HOW OFTEN DO YOU HAVE A DRINK CONTAINING ALCOHOL: 2-3 TIMES A WEEK
HOW MANY STANDARD DRINKS CONTAINING ALCOHOL DO YOU HAVE ON A TYPICAL DAY: 1 OR 2

## 2024-09-30 ASSESSMENT — PATIENT HEALTH QUESTIONNAIRE - PHQ9
SUM OF ALL RESPONSES TO PHQ QUESTIONS 1-9: 0
SUM OF ALL RESPONSES TO PHQ9 QUESTIONS 1 & 2: 0
SUM OF ALL RESPONSES TO PHQ QUESTIONS 1-9: 0
2. FEELING DOWN, DEPRESSED OR HOPELESS: NOT AT ALL
1. LITTLE INTEREST OR PLEASURE IN DOING THINGS: NOT AT ALL

## 2024-09-30 NOTE — PATIENT INSTRUCTIONS
Learning About Being Active as an Older Adult  Why is being active important as you get older?     Being active is one of the best things you can do for your health. And it's never too late to start. Being active--or getting active, if you aren't already--has definite benefits. It can:  Give you more energy,  Keep your mind sharp.  Improve balance to reduce your risk of falls.  Help you manage chronic illness with fewer medicines.  No matter how old you are, how fit you are, or what health problems you have, there is a form of activity that will work for you. And the more physical activity you can do, the better your overall health will be.  What kinds of activity can help you stay healthy?  Being more active will make your daily activities easier. Physical activity includes planned exercise and things you do in daily life. There are four types of activity:  Aerobic.  Doing aerobic activity makes your heart and lungs strong.  Includes walking, dancing, and gardening.  Aim for at least 2½ hours spread throughout the week.  It improves your energy and can help you sleep better.  Muscle-strengthening.  This type of activity can help maintain muscle and strengthen bones.  Includes climbing stairs, using resistance bands, and lifting or carrying heavy loads.  Aim for at least twice a week.  It can help protect the knees and other joints.  Stretching.  Stretching gives you better range of motion in joints and muscles.  Includes upper arm stretches, calf stretches, and gentle yoga.  Aim for at least twice a week, preferably after your muscles are warmed up from other activities.  It can help you function better in daily life.  Balancing.  This helps you stay coordinated and have good posture.  Includes heel-to-toe walking, shabana chi, and certain types of yoga.  Aim for at least 3 days a week.  It can reduce your risk of falling.  Even if you have a hard time meeting the recommendations, it's better to be more active

## 2024-09-30 NOTE — PROGRESS NOTES
Medicare Annual Wellness Visit    Juanita Pérez is here for Medicare AWV    Assessment & Plan   Medicare annual wellness visit, subsequent  Recommendations for Preventive Services Due: see orders and patient instructions/AVS.  Recommended screening schedule for the next 5-10 years is provided to the patient in written form: see Patient Instructions/AVS.     No follow-ups on file.     Subjective     Patient's complete Health Risk Assessment and screening values have been reviewed and are found in Flowsheets. The following problems were reviewed today and where indicated follow up appointments were made and/or referrals ordered.    Positive Risk Factor Screenings with Interventions:                Inactivity:  On average, how many days per week do you engage in moderate to strenuous exercise (like a brisk walk)?: 0 days (!) Abnormal  On average, how many minutes do you engage in exercise at this level?: 0 min  Interventions:  Patient declined any further interventions or treatment\  Stays active in her home     Poor Eating Habits/Diet:  Do you eat balanced/healthy meals regularly?: (!) No  Interventions:  Patient declines any further evaluation or treatment  Appetite has been poor for many years                       Objective   Vitals:    09/30/24 1004   BP: 139/80   Site: Right Upper Arm   Position: Sitting   Cuff Size: Small Adult   Pulse: 79   SpO2: 95%   Weight: 55.3 kg (122 lb)   Height: 1.575 m (5' 2.01\")      Body mass index is 22.31 kg/m².        General Appearance: alert and oriented to person, place and time, well developed and well- nourished, in no acute distress  Skin: warm and dry, no rash or erythema  Head: normocephalic and atraumatic  Neck: supple   Cardiovascular: normal rate, regular rhythm  Abdomen: soft, non-tender  Extremities: no edema         No Known Allergies  Prior to Visit Medications    Medication Sig Taking? Authorizing Provider   VITAMIN D PO Take by mouth Yes Provider, Historical,

## 2025-04-24 ENCOUNTER — OFFICE VISIT (OUTPATIENT)
Dept: INTERNAL MEDICINE CLINIC | Age: 89
End: 2025-04-24

## 2025-04-24 VITALS
SYSTOLIC BLOOD PRESSURE: 122 MMHG | HEIGHT: 62 IN | DIASTOLIC BLOOD PRESSURE: 74 MMHG | HEART RATE: 56 BPM | WEIGHT: 123 LBS | BODY MASS INDEX: 22.63 KG/M2

## 2025-04-24 DIAGNOSIS — J84.112 UIP (USUAL INTERSTITIAL PNEUMONITIS) (HCC): ICD-10-CM

## 2025-04-24 DIAGNOSIS — R41.3 MEMORY LOSS: ICD-10-CM

## 2025-04-24 DIAGNOSIS — K59.00 CONSTIPATION, UNSPECIFIED CONSTIPATION TYPE: Primary | ICD-10-CM

## 2025-04-24 RX ORDER — DOCUSATE SODIUM 100 MG/1
100 CAPSULE, LIQUID FILLED ORAL 2 TIMES DAILY
Qty: 60 CAPSULE | Refills: 0 | Status: SHIPPED | OUTPATIENT
Start: 2025-04-24 | End: 2025-05-24

## 2025-04-24 RX ORDER — POLYETHYLENE GLYCOL 3350 17 G/17G
17 POWDER, FOR SOLUTION ORAL DAILY
Qty: 510 G | Refills: 0 | Status: SHIPPED | OUTPATIENT
Start: 2025-04-24 | End: 2025-05-24

## 2025-04-24 SDOH — ECONOMIC STABILITY: FOOD INSECURITY: WITHIN THE PAST 12 MONTHS, YOU WORRIED THAT YOUR FOOD WOULD RUN OUT BEFORE YOU GOT MONEY TO BUY MORE.: NEVER TRUE

## 2025-04-24 SDOH — ECONOMIC STABILITY: FOOD INSECURITY: WITHIN THE PAST 12 MONTHS, THE FOOD YOU BOUGHT JUST DIDN'T LAST AND YOU DIDN'T HAVE MONEY TO GET MORE.: NEVER TRUE

## 2025-04-24 ASSESSMENT — PATIENT HEALTH QUESTIONNAIRE - PHQ9
SUM OF ALL RESPONSES TO PHQ QUESTIONS 1-9: 0
SUM OF ALL RESPONSES TO PHQ QUESTIONS 1-9: 0
1. LITTLE INTEREST OR PLEASURE IN DOING THINGS: NOT AT ALL
SUM OF ALL RESPONSES TO PHQ QUESTIONS 1-9: 0
2. FEELING DOWN, DEPRESSED OR HOPELESS: NOT AT ALL
SUM OF ALL RESPONSES TO PHQ QUESTIONS 1-9: 0

## 2025-04-24 ASSESSMENT — ENCOUNTER SYMPTOMS
DIARRHEA: 0
CONSTIPATION: 1
SHORTNESS OF BREATH: 0
COUGH: 0
VOMITING: 0
NAUSEA: 0

## 2025-04-24 NOTE — PROGRESS NOTES
Date: 4/24/2025                                               Subjective/Objective:     Chief Complaint   Patient presents with    Abdominal Pain     For 2-3 days    Diarrhea       HPI    Juanita Pérez is a 89 yo female, visit today for c/o constipation. She had abdominal pain across the lower abdomen last night, this has resolved. Had been having normal BM until last 2-3 days. Has not been able to have BM the last 2-3 days. She typically has a soft, daily BM. Denies N/V. Appetite is normal. No diet changes. No blood in the stool, melena. Drinks some water. She has not taken anything for this.     Has had gradual memory loss, going on for a \"while.\" She notices when she is playing cards her and her friends have to finish each others sentences. She lives alone. Has not had difficulty paying her bills but has noticed issues completing math that she hasn't had in the past. She does drive. Does her own shopping. Would like to see neurology.      UIP-following with pulmonology.  She denies symptoms.         Patient Active Problem List    Diagnosis Date Noted    Viral upper respiratory tract infection 12/05/2022    UIP (usual interstitial pneumonitis) (HCC) 07/17/2024    Dyspnea on exertion 06/06/2024    Right shoulder pain 08/02/2023    Cataract 02/24/2016    Essential hypertension 05/16/2014    Mixed hyperlipidemia 05/16/2014    Nasal polyps 10/03/2013    Diverticulosis     Other malaise and fatigue 09/01/2009    Contact dermatitis and other eczema, due to unspecified cause 07/08/2009       Past Medical History:   Diagnosis Date    Cataract 2/24/2016    Diverticulosis     Essential hypertension 5/16/2014    HTN (hypertension)     Mixed hyperlipidemia 5/16/2014       Past Surgical History:   Procedure Laterality Date    CATARACT REMOVAL Bilateral     NASAL POLYP SURGERY         Orders Only on 07/31/2024   Component Date Value Ref Range Status    Myeloperoxidase Ab 07/31/2024 0  0 - 19 AU/mL Final    Comment:

## 2025-04-24 NOTE — PATIENT INSTRUCTIONS
See neurologist for memory   Take miralax and colace daily until soft, daily bowel movement then use as needed

## 2025-05-13 ENCOUNTER — OFFICE VISIT (OUTPATIENT)
Dept: PULMONOLOGY | Age: 89
End: 2025-05-13
Payer: MEDICARE

## 2025-05-13 VITALS
OXYGEN SATURATION: 94 % | DIASTOLIC BLOOD PRESSURE: 80 MMHG | RESPIRATION RATE: 16 BRPM | HEART RATE: 76 BPM | HEIGHT: 62 IN | BODY MASS INDEX: 22.93 KG/M2 | WEIGHT: 124.6 LBS | SYSTOLIC BLOOD PRESSURE: 120 MMHG | TEMPERATURE: 97.4 F

## 2025-05-13 DIAGNOSIS — J84.112 UIP (USUAL INTERSTITIAL PNEUMONITIS) (HCC): Primary | ICD-10-CM

## 2025-05-13 PROCEDURE — 1123F ACP DISCUSS/DSCN MKR DOCD: CPT | Performed by: INTERNAL MEDICINE

## 2025-05-13 PROCEDURE — G8427 DOCREV CUR MEDS BY ELIG CLIN: HCPCS | Performed by: INTERNAL MEDICINE

## 2025-05-13 PROCEDURE — 1036F TOBACCO NON-USER: CPT | Performed by: INTERNAL MEDICINE

## 2025-05-13 PROCEDURE — 1159F MED LIST DOCD IN RCRD: CPT | Performed by: INTERNAL MEDICINE

## 2025-05-13 PROCEDURE — G8420 CALC BMI NORM PARAMETERS: HCPCS | Performed by: INTERNAL MEDICINE

## 2025-05-13 PROCEDURE — G2211 COMPLEX E/M VISIT ADD ON: HCPCS | Performed by: INTERNAL MEDICINE

## 2025-05-13 PROCEDURE — 1090F PRES/ABSN URINE INCON ASSESS: CPT | Performed by: INTERNAL MEDICINE

## 2025-05-13 PROCEDURE — 99213 OFFICE O/P EST LOW 20 MIN: CPT | Performed by: INTERNAL MEDICINE

## 2025-05-13 ASSESSMENT — ENCOUNTER SYMPTOMS: RESPIRATORY NEGATIVE: 1

## 2025-05-13 NOTE — ASSESSMENT & PLAN NOTE
Chronic, at goal (stable), continue current treatment plan and lifestyle modifications recommended  - Has discussed antifibrotic's in the past symptoms are stable she would like to defer at this time we will continue to monitor based on symptoms  - Serologic workup unremarkable

## 2025-05-13 NOTE — PROGRESS NOTES
[facility] PULMONARY, SLEEP, AND CRITICAL CARE    Juanita Pérez (:  10/12/1934) is a 89 y.o. female,New patient, here for evaluation of the following chief complaint(s):  Follow-up (UIP)      Assessment & Plan   ASSESSMENT/PLAN:  1. UIP (usual interstitial pneumonitis) (HCC)  Assessment & Plan:   Chronic, at goal (stable), continue current treatment plan and lifestyle modifications recommended  - Has discussed antifibrotic's in the past symptoms are stable she would like to defer at this time we will continue to monitor based on symptoms  - Serologic workup unremarkable          Return in about 6 months (around 2025).  Future Appointments   Date Time Provider Department Center   2025  1:00 PM Guillermo Barry MD  PULM [facility]                Subjective   SUBJECTIVE/OBJECTIVE:  never smoker presents for follow-up IPF.  Autoimmune serologies negative.  -Symptoms stable.  Still able to walk a block, able to walk around [employer].  It somewhat dyspneic going up stairs to the wise is totally functional no change in symptoms.        Review of Systems   Constitutional: Negative.    Respiratory: Negative.     Cardiovascular: Negative.    Musculoskeletal: Negative.           Objective   Physical Exam     Gen:  No acute distress.   Eyes: EOMI. Anicteric sclera. No conjunctival injection.   ENT: No discharge.External appearance of ears and nose normal.  Neck: Trachea midline. No mass   Resp:  No crackles. No wheezes. No rhonchi. No dullness on percussion.  CV: Regular rate. Regular rhythm. No murmur or rub. No edema.   Neuro:  no focal neurologic deficit.  Moves all extremities  Psych: Awake and alert, Oriented x 3.  Judgement and insight appropriate.  Mood stable.    I spent 22 minutes on this case today, >50% was face-to-face in discussion of the diagnosis and the coordination of care in regards to the treatment plan.  All questions answered.   An electronic signature was used to authenticate this

## 2025-06-18 ENCOUNTER — TRANSCRIBE ORDERS (OUTPATIENT)
Dept: ADMINISTRATIVE | Age: 89
End: 2025-06-18

## 2025-06-18 DIAGNOSIS — R41.3 MEMORY CHANGES: Primary | ICD-10-CM

## 2025-06-27 ENCOUNTER — HOSPITAL ENCOUNTER (OUTPATIENT)
Dept: MRI IMAGING | Age: 89
Discharge: HOME | End: 2025-06-27
Attending: PSYCHIATRY & NEUROLOGY
Payer: MEDICARE

## 2025-06-27 DIAGNOSIS — R41.3 MEMORY CHANGES: ICD-10-CM

## 2025-06-27 PROCEDURE — 70551 MRI BRAIN STEM W/O DYE: CPT

## 2025-06-30 ENCOUNTER — TELEPHONE (OUTPATIENT)
Dept: PULMONOLOGY | Age: 89
End: 2025-06-30

## 2025-06-30 NOTE — TELEPHONE ENCOUNTER
Pt called and is stating that she has decided she does want to have the albuterol     Pharmacy: [facility] [place]

## 2025-07-07 ENCOUNTER — TELEPHONE (OUTPATIENT)
Dept: PULMONOLOGY | Age: 89
End: 2025-07-07